# Patient Record
Sex: FEMALE | Race: WHITE | NOT HISPANIC OR LATINO | URBAN - METROPOLITAN AREA
[De-identification: names, ages, dates, MRNs, and addresses within clinical notes are randomized per-mention and may not be internally consistent; named-entity substitution may affect disease eponyms.]

---

## 2017-09-22 PROBLEM — Z00.00 ENCOUNTER FOR PREVENTIVE HEALTH EXAMINATION: Status: ACTIVE | Noted: 2017-09-22

## 2017-09-28 ENCOUNTER — OUTPATIENT (OUTPATIENT)
Dept: OUTPATIENT SERVICES | Facility: HOSPITAL | Age: 77
LOS: 1 days | End: 2017-09-28
Payer: MEDICARE

## 2017-09-28 ENCOUNTER — APPOINTMENT (OUTPATIENT)
Dept: CT IMAGING | Facility: CLINIC | Age: 77
End: 2017-09-28
Payer: MEDICARE

## 2017-09-28 DIAGNOSIS — Z00.8 ENCOUNTER FOR OTHER GENERAL EXAMINATION: ICD-10-CM

## 2017-09-28 PROCEDURE — 70450 CT HEAD/BRAIN W/O DYE: CPT

## 2017-09-28 PROCEDURE — 70450 CT HEAD/BRAIN W/O DYE: CPT | Mod: 26

## 2017-12-28 ENCOUNTER — OUTPATIENT (OUTPATIENT)
Dept: OUTPATIENT SERVICES | Facility: HOSPITAL | Age: 77
LOS: 1 days | End: 2017-12-28
Payer: MEDICARE

## 2017-12-28 ENCOUNTER — APPOINTMENT (OUTPATIENT)
Dept: CT IMAGING | Facility: CLINIC | Age: 77
End: 2017-12-28
Payer: MEDICARE

## 2017-12-28 DIAGNOSIS — Z00.8 ENCOUNTER FOR OTHER GENERAL EXAMINATION: ICD-10-CM

## 2017-12-28 PROCEDURE — 74177 CT ABD & PELVIS W/CONTRAST: CPT

## 2017-12-28 PROCEDURE — 82565 ASSAY OF CREATININE: CPT

## 2017-12-28 PROCEDURE — 74177 CT ABD & PELVIS W/CONTRAST: CPT | Mod: 26

## 2018-08-06 ENCOUNTER — EMERGENCY (EMERGENCY)
Facility: HOSPITAL | Age: 78
LOS: 1 days | Discharge: HOME | End: 2018-08-06
Attending: EMERGENCY MEDICINE | Admitting: EMERGENCY MEDICINE
Payer: COMMERCIAL

## 2018-08-06 VITALS
WEIGHT: 123.46 LBS | OXYGEN SATURATION: 99 % | TEMPERATURE: 98 F | DIASTOLIC BLOOD PRESSURE: 69 MMHG | RESPIRATION RATE: 16 BRPM | HEART RATE: 111 BPM | SYSTOLIC BLOOD PRESSURE: 111 MMHG

## 2018-08-06 VITALS
TEMPERATURE: 98 F | HEART RATE: 96 BPM | SYSTOLIC BLOOD PRESSURE: 93 MMHG | OXYGEN SATURATION: 96 % | RESPIRATION RATE: 18 BRPM | DIASTOLIC BLOOD PRESSURE: 52 MMHG

## 2018-08-06 DIAGNOSIS — R07.81 PLEURODYNIA: ICD-10-CM

## 2018-08-06 PROCEDURE — 71101 X-RAY EXAM UNILAT RIBS/CHEST: CPT

## 2018-08-06 PROCEDURE — 71101 X-RAY EXAM UNILAT RIBS/CHEST: CPT | Mod: 26

## 2018-08-06 PROCEDURE — 99283 EMERGENCY DEPT VISIT LOW MDM: CPT

## 2018-08-06 RX ORDER — ACETAMINOPHEN 500 MG
650 TABLET ORAL ONCE
Qty: 0 | Refills: 0 | Status: COMPLETED | OUTPATIENT
Start: 2018-08-06 | End: 2018-08-06

## 2018-08-06 RX ADMIN — Medication 650 MILLIGRAM(S): at 15:28

## 2018-08-06 NOTE — ED PROVIDER NOTE - CALF TENDERNESS
tender left lateral chest wall with palpation along rib cage ; no crepitus; no bruising; no step off.

## 2018-08-06 NOTE — ED ADULT NURSE NOTE - INTERVENTIONS DEFINITIONS
Room bathroom lighting operational/Stretcher in lowest position, wheels locked, appropriate side rails in place/Monitor for mental status changes and reorient to person, place, and time/Review medications for side effects contributing to fall risk/Reinforce activity limits and safety measures with patient and family/[place] to call system/Non-slip footwear when patient is off stretcher/Physically safe environment: no spills, clutter or unnecessary equipment/Call bell, personal items and telephone within reach/Instruct patient to call for assistance/Monitor gait and stability

## 2018-11-02 ENCOUNTER — INPATIENT (INPATIENT)
Facility: HOSPITAL | Age: 78
LOS: 4 days | Discharge: HOME | DRG: 843 | End: 2018-11-07
Attending: FAMILY MEDICINE | Admitting: FAMILY MEDICINE
Payer: MEDICARE

## 2018-11-02 VITALS
HEART RATE: 92 BPM | OXYGEN SATURATION: 99 % | HEIGHT: 64 IN | SYSTOLIC BLOOD PRESSURE: 100 MMHG | DIASTOLIC BLOOD PRESSURE: 55 MMHG | WEIGHT: 110.01 LBS | TEMPERATURE: 98 F | RESPIRATION RATE: 16 BRPM

## 2018-11-02 DIAGNOSIS — I49.9 CARDIAC ARRHYTHMIA, UNSPECIFIED: ICD-10-CM

## 2018-11-02 DIAGNOSIS — R18.8 OTHER ASCITES: ICD-10-CM

## 2018-11-02 DIAGNOSIS — D50.8 OTHER IRON DEFICIENCY ANEMIAS: ICD-10-CM

## 2018-11-02 DIAGNOSIS — Z98.890 OTHER SPECIFIED POSTPROCEDURAL STATES: Chronic | ICD-10-CM

## 2018-11-02 DIAGNOSIS — Z29.9 ENCOUNTER FOR PROPHYLACTIC MEASURES, UNSPECIFIED: ICD-10-CM

## 2018-11-02 DIAGNOSIS — Z90.710 ACQUIRED ABSENCE OF BOTH CERVIX AND UTERUS: Chronic | ICD-10-CM

## 2018-11-02 PROBLEM — S06.9X9S UNSPECIFIED INTRACRANIAL INJURY WITH LOSS OF CONSCIOUSNESS OF UNSPECIFIED DURATION, SEQUELA: Chronic | Status: ACTIVE | Noted: 2018-08-06

## 2018-11-02 LAB
ALBUMIN SERPL ELPH-MCNC: 1.9 G/DL — LOW (ref 3.3–5)
ALP SERPL-CCNC: 97 U/L — SIGNIFICANT CHANGE UP (ref 30–120)
ALT FLD-CCNC: 14 U/L DA — SIGNIFICANT CHANGE UP (ref 10–60)
ANION GAP SERPL CALC-SCNC: 5 MMOL/L — SIGNIFICANT CHANGE UP (ref 5–17)
APTT BLD: 27.5 SEC — SIGNIFICANT CHANGE UP (ref 27.5–36.3)
AST SERPL-CCNC: 22 U/L — SIGNIFICANT CHANGE UP (ref 10–40)
BASOPHILS # BLD AUTO: 0.02 K/UL — SIGNIFICANT CHANGE UP (ref 0–0.2)
BASOPHILS NFR BLD AUTO: 0.2 % — SIGNIFICANT CHANGE UP (ref 0–2)
BILIRUB SERPL-MCNC: 0.4 MG/DL — SIGNIFICANT CHANGE UP (ref 0.2–1.2)
BUN SERPL-MCNC: 11 MG/DL — SIGNIFICANT CHANGE UP (ref 7–23)
CALCIUM SERPL-MCNC: 8.7 MG/DL — SIGNIFICANT CHANGE UP (ref 8.4–10.5)
CHLORIDE SERPL-SCNC: 102 MMOL/L — SIGNIFICANT CHANGE UP (ref 96–108)
CO2 SERPL-SCNC: 33 MMOL/L — HIGH (ref 22–31)
CREAT SERPL-MCNC: 0.6 MG/DL — SIGNIFICANT CHANGE UP (ref 0.5–1.3)
EOSINOPHIL # BLD AUTO: 0.01 K/UL — SIGNIFICANT CHANGE UP (ref 0–0.5)
EOSINOPHIL NFR BLD AUTO: 0.1 % — SIGNIFICANT CHANGE UP (ref 0–6)
GLUCOSE SERPL-MCNC: 101 MG/DL — HIGH (ref 70–99)
HCT VFR BLD CALC: 28.9 % — LOW (ref 34.5–45)
HGB BLD-MCNC: 8.8 G/DL — LOW (ref 11.5–15.5)
IMM GRANULOCYTES NFR BLD AUTO: 0.5 % — SIGNIFICANT CHANGE UP (ref 0–1.5)
INR BLD: 1.12 RATIO — SIGNIFICANT CHANGE UP (ref 0.88–1.16)
LYMPHOCYTES # BLD AUTO: 1.32 K/UL — SIGNIFICANT CHANGE UP (ref 1–3.3)
LYMPHOCYTES # BLD AUTO: 16.3 % — SIGNIFICANT CHANGE UP (ref 13–44)
MCHC RBC-ENTMCNC: 25.8 PG — LOW (ref 27–34)
MCHC RBC-ENTMCNC: 30.4 GM/DL — LOW (ref 32–36)
MCV RBC AUTO: 84.8 FL — SIGNIFICANT CHANGE UP (ref 80–100)
MONOCYTES # BLD AUTO: 0.95 K/UL — HIGH (ref 0–0.9)
MONOCYTES NFR BLD AUTO: 11.7 % — SIGNIFICANT CHANGE UP (ref 2–14)
NEUTROPHILS # BLD AUTO: 5.76 K/UL — SIGNIFICANT CHANGE UP (ref 1.8–7.4)
NEUTROPHILS NFR BLD AUTO: 71.2 % — SIGNIFICANT CHANGE UP (ref 43–77)
NRBC # BLD: 0 /100 WBCS — SIGNIFICANT CHANGE UP (ref 0–0)
PH FLD: 7.47 — SIGNIFICANT CHANGE UP
PLATELET # BLD AUTO: 395 K/UL — SIGNIFICANT CHANGE UP (ref 150–400)
POTASSIUM SERPL-MCNC: 4.4 MMOL/L — SIGNIFICANT CHANGE UP (ref 3.5–5.3)
POTASSIUM SERPL-SCNC: 4.4 MMOL/L — SIGNIFICANT CHANGE UP (ref 3.5–5.3)
PROT SERPL-MCNC: 5.5 G/DL — LOW (ref 6–8.3)
PROTHROM AB SERPL-ACNC: 12.3 SEC — SIGNIFICANT CHANGE UP (ref 10–12.9)
RBC # BLD: 3.41 M/UL — LOW (ref 3.8–5.2)
RBC # FLD: 21.3 % — HIGH (ref 10.3–14.5)
SODIUM SERPL-SCNC: 140 MMOL/L — SIGNIFICANT CHANGE UP (ref 135–145)
WBC # BLD: 8.1 K/UL — SIGNIFICANT CHANGE UP (ref 3.8–10.5)
WBC # FLD AUTO: 8.1 K/UL — SIGNIFICANT CHANGE UP (ref 3.8–10.5)

## 2018-11-02 PROCEDURE — 99223 1ST HOSP IP/OBS HIGH 75: CPT | Mod: AI

## 2018-11-02 PROCEDURE — 93010 ELECTROCARDIOGRAM REPORT: CPT

## 2018-11-02 PROCEDURE — 74176 CT ABD & PELVIS W/O CONTRAST: CPT | Mod: 26

## 2018-11-02 PROCEDURE — 99285 EMERGENCY DEPT VISIT HI MDM: CPT

## 2018-11-02 PROCEDURE — 74019 RADEX ABDOMEN 2 VIEWS: CPT | Mod: 26

## 2018-11-02 PROCEDURE — 71046 X-RAY EXAM CHEST 2 VIEWS: CPT | Mod: 26

## 2018-11-02 RX ORDER — DIGOXIN 250 MCG
1 TABLET ORAL
Qty: 0 | Refills: 0 | COMMUNITY

## 2018-11-02 RX ORDER — METOPROLOL TARTRATE 50 MG
0 TABLET ORAL
Qty: 0 | Refills: 0 | COMMUNITY

## 2018-11-02 RX ORDER — MORPHINE SULFATE 50 MG/1
2 CAPSULE, EXTENDED RELEASE ORAL ONCE
Qty: 0 | Refills: 0 | Status: DISCONTINUED | OUTPATIENT
Start: 2018-11-02 | End: 2018-11-02

## 2018-11-02 RX ORDER — SODIUM CHLORIDE 9 MG/ML
1000 INJECTION INTRAMUSCULAR; INTRAVENOUS; SUBCUTANEOUS ONCE
Qty: 0 | Refills: 0 | Status: COMPLETED | OUTPATIENT
Start: 2018-11-02 | End: 2018-11-02

## 2018-11-02 RX ORDER — MIRTAZAPINE 45 MG/1
15 TABLET, ORALLY DISINTEGRATING ORAL AT BEDTIME
Qty: 0 | Refills: 0 | Status: DISCONTINUED | OUTPATIENT
Start: 2018-11-02 | End: 2018-11-07

## 2018-11-02 RX ORDER — METOPROLOL TARTRATE 50 MG
25 TABLET ORAL DAILY
Qty: 0 | Refills: 0 | Status: DISCONTINUED | OUTPATIENT
Start: 2018-11-02 | End: 2018-11-07

## 2018-11-02 RX ORDER — MORPHINE SULFATE 50 MG/1
2 CAPSULE, EXTENDED RELEASE ORAL EVERY 4 HOURS
Qty: 0 | Refills: 0 | Status: DISCONTINUED | OUTPATIENT
Start: 2018-11-02 | End: 2018-11-07

## 2018-11-02 RX ORDER — ONDANSETRON 8 MG/1
4 TABLET, FILM COATED ORAL ONCE
Qty: 0 | Refills: 0 | Status: COMPLETED | OUTPATIENT
Start: 2018-11-02 | End: 2018-11-02

## 2018-11-02 RX ORDER — DIGOXIN 250 MCG
0.12 TABLET ORAL DAILY
Qty: 0 | Refills: 0 | Status: DISCONTINUED | OUTPATIENT
Start: 2018-11-02 | End: 2018-11-04

## 2018-11-02 RX ORDER — ALBUMIN HUMAN 25 %
50 VIAL (ML) INTRAVENOUS ONCE
Qty: 0 | Refills: 0 | Status: COMPLETED | OUTPATIENT
Start: 2018-11-02 | End: 2018-11-02

## 2018-11-02 RX ORDER — SODIUM CHLORIDE 9 MG/ML
3 INJECTION INTRAMUSCULAR; INTRAVENOUS; SUBCUTANEOUS ONCE
Qty: 0 | Refills: 0 | Status: COMPLETED | OUTPATIENT
Start: 2018-11-02 | End: 2018-11-02

## 2018-11-02 RX ORDER — IOHEXOL 300 MG/ML
30 INJECTION, SOLUTION INTRAVENOUS ONCE
Qty: 0 | Refills: 0 | Status: COMPLETED | OUTPATIENT
Start: 2018-11-02 | End: 2018-11-02

## 2018-11-02 RX ORDER — INFLUENZA VIRUS VACCINE 15; 15; 15; 15 UG/.5ML; UG/.5ML; UG/.5ML; UG/.5ML
0.5 SUSPENSION INTRAMUSCULAR ONCE
Qty: 0 | Refills: 0 | Status: COMPLETED | OUTPATIENT
Start: 2018-11-02 | End: 2018-11-07

## 2018-11-02 RX ADMIN — MORPHINE SULFATE 2 MILLIGRAM(S): 50 CAPSULE, EXTENDED RELEASE ORAL at 14:04

## 2018-11-02 RX ADMIN — Medication 50 MILLILITER(S): at 21:51

## 2018-11-02 RX ADMIN — SODIUM CHLORIDE 1000 MILLILITER(S): 9 INJECTION INTRAMUSCULAR; INTRAVENOUS; SUBCUTANEOUS at 14:09

## 2018-11-02 RX ADMIN — ONDANSETRON 4 MILLIGRAM(S): 8 TABLET, FILM COATED ORAL at 15:55

## 2018-11-02 RX ADMIN — MORPHINE SULFATE 2 MILLIGRAM(S): 50 CAPSULE, EXTENDED RELEASE ORAL at 21:33

## 2018-11-02 RX ADMIN — SODIUM CHLORIDE 1000 MILLILITER(S): 9 INJECTION INTRAMUSCULAR; INTRAVENOUS; SUBCUTANEOUS at 15:15

## 2018-11-02 RX ADMIN — Medication 50 MILLILITER(S): at 20:58

## 2018-11-02 RX ADMIN — MORPHINE SULFATE 2 MILLIGRAM(S): 50 CAPSULE, EXTENDED RELEASE ORAL at 15:28

## 2018-11-02 RX ADMIN — ONDANSETRON 4 MILLIGRAM(S): 8 TABLET, FILM COATED ORAL at 14:04

## 2018-11-02 RX ADMIN — IOHEXOL 30 MILLILITER(S): 300 INJECTION, SOLUTION INTRAVENOUS at 14:30

## 2018-11-02 RX ADMIN — MIRTAZAPINE 15 MILLIGRAM(S): 45 TABLET, ORALLY DISINTEGRATING ORAL at 23:01

## 2018-11-02 RX ADMIN — MORPHINE SULFATE 2 MILLIGRAM(S): 50 CAPSULE, EXTENDED RELEASE ORAL at 20:42

## 2018-11-02 RX ADMIN — SODIUM CHLORIDE 3 MILLILITER(S): 9 INJECTION INTRAMUSCULAR; INTRAVENOUS; SUBCUTANEOUS at 14:09

## 2018-11-02 NOTE — ED PROVIDER NOTE - OBJECTIVE STATEMENT
Pt is a 79 y/o F, with hx of TBI, presenting to the ED with c/o abdominal distention and discomfort for the past week. Pt states she has had constipation over the same time frame but had one small BM yesterday. Associated N/V. Denies fever, chills, CP, SOB, and any other sxs. No hx of same.  Brissa- PMRALEIGH

## 2018-11-02 NOTE — H&P ADULT - HISTORY OF PRESENT ILLNESS
78F with unknown primary CA s/p hysterectomy in 2015 (with some lymph nodes still present), hx of syncope with a TBI in 2017 with resulting cognitive impairment who presents with abdominal distension and pain.  Patient has been suffering off and on with abdominal discomfort for awhile but for the past week her abdomen has grown in size.  With the increase in size her discomfort has now progressed to pain.  Patient also now has started to experience nausea and bilious nonbloody vomiting.  Patient went to her NP yesterday and they were concerned for a blockage and had an xray.  Since the symptoms did not irma, patient was told to come here for further evaluation and treatment.  In the ED, patient was found to massive ascites on CT.  Also found to have anemia with hgb of 8.8. 78F with unknown primary CA s/p hysterectomy in 2015 (with some lymph nodes still present), hx of syncope with a TBI in 2017 with resulting mild cognitive impairment who presents with abdominal distension and pain.  Patient has been suffering off and on with abdominal discomfort for awhile but for the past week her abdomen has grown in size.  With the increase in size her discomfort has now progressed to pain.  Patient also now has started to experience nausea and bilious nonbloody vomiting.  Patient went to her NP yesterday and they were concerned for a blockage and had an xray.  Since the symptoms did not irma, patient was told to come here for further evaluation and treatment.  In the ED, patient was found to massive ascites on CT.  Also found to have anemia with hgb of 8.8.

## 2018-11-02 NOTE — ED ADULT NURSE REASSESSMENT NOTE - NS ED NURSE REASSESS COMMENT FT1
pt resting comfortably on stretcher skin warm and dry no distress pt wants food pt pending admission
pt tolerated oral contrast and pt pending ct scan  no vomiting no diarrhea since arrival
pt c/o nausea and medicated for nausea vomited about 100cc bilious material nausea better after zofran

## 2018-11-02 NOTE — ED PROVIDER NOTE - PROGRESS NOTE DETAILS
Dr Qasim greenwood Fabiano Tripp, will hold admission until he fabiano HODGE. Dr Qasim greenwood. Pt doing well, no acute co or changes. Pain controlled at this time. Dw Dr Tripp, he dw Dr VEL Fernandes, now accepts pt.

## 2018-11-02 NOTE — H&P ADULT - NSHPPHYSICALEXAM_GEN_ALL_CORE
PHYSICAL EXAM:  Vital Signs Last 24 Hrs  T(C): 36.7 (02 Nov 2018 19:18), Max: 36.7 (02 Nov 2018 19:18)  T(F): 98.1 (02 Nov 2018 19:18), Max: 98.1 (02 Nov 2018 19:18)  HR: 96 (02 Nov 2018 20:46) (92 - 101)  BP: 107/60 (02 Nov 2018 20:46) (100/42 - 108/58)  BP(mean): --  RR: 20 (02 Nov 2018 20:46) (16 - 20)  SpO2: 100% (02 Nov 2018 20:46) (98% - 100%)    GENERAL:     thin female in NAD  HEAD:     atraumatic, normocephalic  EYES:     EOMI, conjunctiva and sclera clear  ENMT:     no tonsillar erythema or exudates or enlargement, no oral lesions, moist mucous membranes, good dentition  NECK:     supple, no JVD  RESPIRATORY:     clear to auscultation bilaterally, no rales or rhonchi or wheezing or rubs  CARDIOVASCULAR:     regular rate and rhythm, no murmurs or rubs or gallops, 2+ peripheral pulses  GASTROINTESTINAL:     soft, nontender, nondistended, no hepatosplenomegaly palpated, bowel sounds present  EXTREMITIES:     no clubbing or cyanosis or edema  MUSCULOSKELETAL:     no joint pain or swelling or deformities  NERVOUS SYSTEM:     motor strength intact with 5/5 B/L upper and lower extremities, no gross sensory deficits  SKIN:     no rashes or lesions  PSYCH:     appropriate, alert and orientated x3, good concentration PHYSICAL EXAM:  Vital Signs Last 24 Hrs  T(C): 36.7 (02 Nov 2018 19:18), Max: 36.7 (02 Nov 2018 19:18)  T(F): 98.1 (02 Nov 2018 19:18), Max: 98.1 (02 Nov 2018 19:18)  HR: 96 (02 Nov 2018 20:46) (92 - 101)  BP: 107/60 (02 Nov 2018 20:46) (100/42 - 108/58)  BP(mean): --  RR: 20 (02 Nov 2018 20:46) (16 - 20)  SpO2: 100% (02 Nov 2018 20:46) (98% - 100%)    GENERAL:     thin female in NAD  HEAD:     atraumatic, normocephalic  EYES:     EOMI, conjunctiva and sclera clear  ENMT:     no tonsillar erythema or exudates or enlargement, no oral lesions, moist mucous membranes, good dentition  NECK:     supple, no JVD  RESPIRATORY:     clear to auscultation bilaterally, no rales or rhonchi or wheezing or rubs  CARDIOVASCULAR:     +tachycardia, no murmurs or rubs or gallops, 2+ peripheral pulses  GASTROINTESTINAL:     +large, +distended, +tender to palpation, +fluid wave, +  EXTREMITIES:     no clubbing or cyanosis or edema  MUSCULOSKELETAL:     no joint pain or swelling or deformities  NERVOUS SYSTEM:     motor strength intact with 5/5 B/L upper and lower extremities, no gross sensory deficits  SKIN:     no rashes or lesions  PSYCH:     appropriate, alert and orientated x3, good concentration

## 2018-11-02 NOTE — H&P ADULT - ASSESSMENT
78F with unknown primary CA s/p hysterectomy in 2015 (with some lymph nodes still present), hx of syncope with a TBI in 2017 with resulting mild cognitive impairment who presents with abdominal distension and pain.

## 2018-11-02 NOTE — H&P ADULT - PROBLEM SELECTOR PLAN 3
IMPROVE VTE Individual Risk Assessment          RISK                                                          Points  [  ] Previous VTE                                                 3  [  ] Thrombophilia                                              2  [  ] Lower limb paralysis                                    2        (unable to hold up >15 seconds)    [  ] Current Cancer                                             2         (within 6 months)  [  ] Immobilization > 24 hrs                              1  [  ] ICU/CCU stay > 24 hours                            1  [X] Age > 60                                                        1    IMPROVE VTE Score  1     - hold AC as patient will be having paracentesis.  SCD for now

## 2018-11-02 NOTE — PROCEDURE NOTE - PROCEDURE
<<-----Click on this checkbox to enter Procedure Paracentesis at bedside  11/02/2018  s/p tasp sterile tecnique 5 cc of lidocaine was injected withdrew about 6 liters of fluid with 25% albumin given  Active  KSIDERID

## 2018-11-02 NOTE — H&P ADULT - FAMILY HISTORY
Mother  Still living? Unknown  Family history of cancer in mother, Age at diagnosis: Age Unknown  Family history of heart disease, Age at diagnosis: Age Unknown

## 2018-11-02 NOTE — H&P ADULT - PMH
Anemia    Atrial fibrillation  daughter reports no hx of afib, just palpitations.  Stomach cancer    Traumatic brain injury with loss of consciousness, sequela

## 2018-11-02 NOTE — CONSULT NOTE ADULT - SUBJECTIVE AND OBJECTIVE BOX
Chief Complaint:  Patient is a 78y old  Female who presents with a chief complaint of   · Chief Complaint: The patient is a 78y Female complaining of abdominal pain.	  · HPI Objective Statement: Pt is a 79 y/o F, with hx of TBI, presenting to the ED with c/o abdominal distention and discomfort for the past week. Pt states she has had constipation over the same time frame but had one small BM yesterday. Associated N/V. Denies fever, chills, CP, SOB, and any other sxs. No hx of same. Mazid- PMD	  · Presenting Symptoms: ABDOMINAL DISTENSION, abd discomfort	  · Negative Findings: no chills, no fever	  · Location: abdomen	  · Timing: constant	  · Duration: week(s)  1	  · Context: dec BM	  · Recent Exposure To: none known	  ascites on exam    Allergies:  No Known Allergies      Medications:  albumin human 25% IVPB 50 milliLiter(s) IV Intermittent once  digoxin     Tablet 0.125 milliGRAM(s) Oral daily  influenza   Vaccine 0.5 milliLiter(s) IntraMuscular once  metoprolol succinate ER 25 milliGRAM(s) Oral daily  mirtazapine 15 milliGRAM(s) Oral at bedtime  morphine  - Injectable 2 milliGRAM(s) IV Push every 4 hours PRN      PMHX/PSHX:  Anemia  Atrial fibrillation  Stomach cancer  Traumatic brain injury with loss of consciousness, sequela  History of loop recorder  H/O: hysterectomy  No significant past surgical history      Family history:  No pertinent family history in first degree relatives      Social History: lives with family no etoh nop cigs    ROS:     General:  No wt loss, fevers, chills, night sweats, fatigue,   Eyes:  Good vision, no reported pain  ENT:  No sore throat, pain, runny nose, dysphagia  CV:  No pain, palpitations, hypo/hypertension  Resp:  No dyspnea, cough, tachypnea, wheezing  GI:  No pain, No nausea, No vomiting, No diarrhea, No constipation, No weight loss, No fever, No pruritis, No rectal bleeding, No tarry stools, No dysphagia,  :  No pain, bleeding, incontinence, nocturia  Muscle:  No pain, weakness  Neuro:  No weakness, tingling, memory problems  Psych:  No fatigue, insomnia, mood problems, depression  Endocrine:  No polyuria, polydipsia, cold/heat intolerance  Heme:  No petechiae, ecchymosis, easy bruisability  Skin:  No rash, tattoos, scars, edema      PHYSICAL EXAM:   Vital Signs:  Vital Signs Last 24 Hrs  T(C): 36.7 (02 Nov 2018 19:18), Max: 36.7 (02 Nov 2018 19:18)  T(F): 98.1 (02 Nov 2018 19:18), Max: 98.1 (02 Nov 2018 19:18)  HR: 101 (02 Nov 2018 19:18) (92 - 101)  BP: 105/44 (02 Nov 2018 19:18) (100/42 - 108/58)  BP(mean): --  RR: 20 (02 Nov 2018 18:18) (16 - 20)  SpO2: 100% (02 Nov 2018 18:18) (98% - 100%)  Daily Height in cm: 162.56 (02 Nov 2018 13:39)    Daily     GENERAL:  Appears stated age, well-groomed, well-nourished, no distress  HEENT:  NC/AT,  conjunctivae clear and pink, no thyromegaly, nodules, adenopathy, no JVD, sclera -anicteric  CHEST:  Full & symmetric excursion, no increased effort, breath sounds clear  HEART:  Regular rhythm, S1, S2, no murmur/rub/S3/S4, no abdominal bruit, no edema  ABDOMEN:  Soft, non-tender, non-distended, normoactive bowel sounds,  no masses ,no hepato-splenomegaly, no signs of chronic liver disease  EXTEREMITIES:  no cyanosis,clubbing or edema  SKIN:  No rash/erythema/ecchymoses/petechiae/wounds/abscess/warm/dry  NEURO:  Alert, oriented, no asterixis, no tremor, no encephalopathy    LABS:                        8.8    8.10  )-----------( 395      ( 02 Nov 2018 14:01 )             28.9     11-02    140  |  102  |  11  ----------------------------<  101<H>  4.4   |  33<H>  |  0.60    Ca    8.7      02 Nov 2018 14:01    TPro  5.5<L>  /  Alb  1.9<L>  /  TBili  0.4  /  DBili  x   /  AST  22  /  ALT  14  /  AlkPhos  97  11-02    LIVER FUNCTIONS - ( 02 Nov 2018 14:01 )  Alb: 1.9 g/dL / Pro: 5.5 g/dL / ALK PHOS: 97 U/L / ALT: 14 U/L DA / AST: 22 U/L / GGT: x           PT/INR - ( 02 Nov 2018 14:01 )   PT: 12.3 sec;   INR: 1.12 ratio         PTT - ( 02 Nov 2018 14:01 )  PTT:27.5 sec        Imaging:

## 2018-11-02 NOTE — H&P ADULT - NSHPREVIEWOFSYSTEMS_GEN_ALL_CORE
REVIEW OF SYSTEMS:  CONSTITUTIONAL:    +weight gain in her abdomen but weight loss overall  EYES:    no eye pain or visual disturbances or discharge  ENMT:     no difficulty hearing or tinnitus or vertigo, no sinus or throat pain  NECK:    no pain or stiffness  RESPIRATORY:    no cough or wheezing or chills or hemoptysis, no shortness of breath  CARDIOVASCULAR:    no chest pain or palpitations or dizziness or leg swelling  GASTROINTESTINAL:    +abdominal pain, +nausea/vomiting   GENITOURINARY:    no dysuria or frequency or hematuria or incontinence  NEUROLOGICAL:    no headaches or memory loss or loss of strength or numbness or tremors  SKIN:    no itching or burning or rashes or lesions   LYMPH NODES:    no enlarged glands  ENDOCRINE:    no heat or cold intolerance, no hair loss, no polydipsia or polyuria  MUSCULOSKELETAL:    no joint pain or swelling, no muscle or back or extremity pain  PSYCHIATRIC:    no depression or anxiety or mood swings or difficulty sleeping  HEME/LYMPH:    no easy bruising or bleeding gums  ALLERGY AND IMMUNOLOGIC:    no hives or eczema

## 2018-11-02 NOTE — ED ADULT NURSE NOTE - OBJECTIVE STATEMENT
pt c/o abd distention went to [facility] yesterday and given miralax and mag citrate and has 1 bm loose dark conjunctiva pale lungs diminished right base otherwise clear  pt c/o nausea and vomited x 1 green fluid

## 2018-11-02 NOTE — ED ADULT NURSE NOTE - NSIMPLEMENTINTERV_GEN_ALL_ED
Implemented All Fall with Harm Risk Interventions:  [place] to call system. Call bell, personal items and telephone within reach. Instruct patient to call for assistance. Room bathroom lighting operational. Non-slip footwear when patient is off stretcher. Physically safe environment: no spills, clutter or unnecessary equipment. Stretcher in lowest position, wheels locked, appropriate side rails in place. Provide visual cue, wrist band, yellow gown, etc. Monitor gait and stability. Monitor for mental status changes and reorient to person, place, and time. Review medications for side effects contributing to fall risk. Reinforce activity limits and safety measures with patient and family. Provide visual clues: red socks.

## 2018-11-02 NOTE — H&P ADULT - NSHPSOCIALHISTORY_GEN_ALL_CORE
+ former smoker (quit 30 yrs ago, was a 1ppd for about 30 yrs)  - denies any etoh or illegal drug use  - lives with family

## 2018-11-02 NOTE — ED ADULT NURSE NOTE - PMH
Anemia    Atrial fibrillation    Stomach cancer    Traumatic brain injury with loss of consciousness, sequela

## 2018-11-02 NOTE — ED PROVIDER NOTE - CARE PLAN
Principal Discharge DX:	Other ascites  Secondary Diagnosis:	Abdominal pain, unspecified abdominal location

## 2018-11-02 NOTE — ED PROVIDER NOTE - MEDICAL DECISION MAKING DETAILS
labs, Xray, zofran, morphine, IV fluids. pt with 1 week abd pain, distention, nausea, vomiting, decreased stools - labs, Xray, zofran, morphine, IV fluids.

## 2018-11-03 DIAGNOSIS — D64.9 ANEMIA, UNSPECIFIED: ICD-10-CM

## 2018-11-03 DIAGNOSIS — R18.0 MALIGNANT ASCITES: ICD-10-CM

## 2018-11-03 DIAGNOSIS — D50.9 IRON DEFICIENCY ANEMIA, UNSPECIFIED: ICD-10-CM

## 2018-11-03 LAB
ALBUMIN SERPL ELPH-MCNC: 1.9 G/DL — LOW (ref 3.3–5)
ALP SERPL-CCNC: 71 U/L — SIGNIFICANT CHANGE UP (ref 30–120)
ALT FLD-CCNC: 12 U/L DA — SIGNIFICANT CHANGE UP (ref 10–60)
AMYLASE FLD-CCNC: 24 U/L — SIGNIFICANT CHANGE UP
ANION GAP SERPL CALC-SCNC: 6 MMOL/L — SIGNIFICANT CHANGE UP (ref 5–17)
APTT BLD: 31.5 SEC — SIGNIFICANT CHANGE UP (ref 27.5–36.3)
AST SERPL-CCNC: 15 U/L — SIGNIFICANT CHANGE UP (ref 10–40)
B PERT IGG+IGM PNL SER: ABNORMAL
BASOPHILS # BLD AUTO: 0.02 K/UL — SIGNIFICANT CHANGE UP (ref 0–0.2)
BASOPHILS NFR BLD AUTO: 0.3 % — SIGNIFICANT CHANGE UP (ref 0–2)
BILIRUB SERPL-MCNC: 0.3 MG/DL — SIGNIFICANT CHANGE UP (ref 0.2–1.2)
BLD GP AB SCN SERPL QL: SIGNIFICANT CHANGE UP
BUN SERPL-MCNC: 11 MG/DL — SIGNIFICANT CHANGE UP (ref 7–23)
CALCIUM SERPL-MCNC: 8.2 MG/DL — LOW (ref 8.4–10.5)
CHLORIDE SERPL-SCNC: 105 MMOL/L — SIGNIFICANT CHANGE UP (ref 96–108)
CO2 SERPL-SCNC: 30 MMOL/L — SIGNIFICANT CHANGE UP (ref 22–31)
COLOR FLD: SIGNIFICANT CHANGE UP
CREAT SERPL-MCNC: 0.62 MG/DL — SIGNIFICANT CHANGE UP (ref 0.5–1.3)
EOSINOPHIL # BLD AUTO: 0.04 K/UL — SIGNIFICANT CHANGE UP (ref 0–0.5)
EOSINOPHIL NFR BLD AUTO: 0.6 % — SIGNIFICANT CHANGE UP (ref 0–6)
FLUID INTAKE SUBSTANCE CLASS: SIGNIFICANT CHANGE UP
FLUID SEGMENTED GRANULOCYTES: 3 % — SIGNIFICANT CHANGE UP
GLUCOSE FLD-MCNC: 100 MG/DL — SIGNIFICANT CHANGE UP
GLUCOSE SERPL-MCNC: 74 MG/DL — SIGNIFICANT CHANGE UP (ref 70–99)
HCT VFR BLD CALC: 23.7 % — LOW (ref 34.5–45)
HCT VFR BLD CALC: 24.2 % — LOW (ref 34.5–45)
HGB BLD-MCNC: 7 G/DL — CRITICAL LOW (ref 11.5–15.5)
HGB BLD-MCNC: 7.2 G/DL — LOW (ref 11.5–15.5)
IMM GRANULOCYTES NFR BLD AUTO: 0.3 % — SIGNIFICANT CHANGE UP (ref 0–1.5)
INR BLD: 1.17 RATIO — HIGH (ref 0.88–1.16)
LDH SERPL L TO P-CCNC: 105 U/L — SIGNIFICANT CHANGE UP
LYMPHOCYTES # BLD AUTO: 0.91 K/UL — LOW (ref 1–3.3)
LYMPHOCYTES # BLD AUTO: 14.6 % — SIGNIFICANT CHANGE UP (ref 13–44)
LYMPHOCYTES # FLD: 44 % — SIGNIFICANT CHANGE UP
MAGNESIUM SERPL-MCNC: 2.1 MG/DL — SIGNIFICANT CHANGE UP (ref 1.6–2.6)
MCHC RBC-ENTMCNC: 25.4 PG — LOW (ref 27–34)
MCHC RBC-ENTMCNC: 25.4 PG — LOW (ref 27–34)
MCHC RBC-ENTMCNC: 29.5 GM/DL — LOW (ref 32–36)
MCHC RBC-ENTMCNC: 29.8 GM/DL — LOW (ref 32–36)
MCV RBC AUTO: 85.5 FL — SIGNIFICANT CHANGE UP (ref 80–100)
MCV RBC AUTO: 85.9 FL — SIGNIFICANT CHANGE UP (ref 80–100)
MONOCYTES # BLD AUTO: 0.87 K/UL — SIGNIFICANT CHANGE UP (ref 0–0.9)
MONOCYTES NFR BLD AUTO: 13.9 % — SIGNIFICANT CHANGE UP (ref 2–14)
MONOS+MACROS # FLD: 53 % — SIGNIFICANT CHANGE UP
NEUTROPHILS # BLD AUTO: 4.39 K/UL — SIGNIFICANT CHANGE UP (ref 1.8–7.4)
NEUTROPHILS NFR BLD AUTO: 70.3 % — SIGNIFICANT CHANGE UP (ref 43–77)
NRBC # BLD: 0 /100 WBCS — SIGNIFICANT CHANGE UP (ref 0–0)
PHOSPHATE SERPL-MCNC: 3.5 MG/DL — SIGNIFICANT CHANGE UP (ref 2.5–4.5)
PLATELET # BLD AUTO: 316 K/UL — SIGNIFICANT CHANGE UP (ref 150–400)
PLATELET # BLD AUTO: 344 K/UL — SIGNIFICANT CHANGE UP (ref 150–400)
POTASSIUM SERPL-MCNC: 4.2 MMOL/L — SIGNIFICANT CHANGE UP (ref 3.5–5.3)
POTASSIUM SERPL-SCNC: 4.2 MMOL/L — SIGNIFICANT CHANGE UP (ref 3.5–5.3)
PROT FLD-MCNC: 3.1 G/DL — SIGNIFICANT CHANGE UP
PROT SERPL-MCNC: 4.7 G/DL — LOW (ref 6–8.3)
PROTHROM AB SERPL-ACNC: 12.8 SEC — SIGNIFICANT CHANGE UP (ref 10–12.9)
RBC # BLD: 2.76 M/UL — LOW (ref 3.8–5.2)
RBC # BLD: 2.83 M/UL — LOW (ref 3.8–5.2)
RBC # FLD: 21.1 % — HIGH (ref 10.3–14.5)
RBC # FLD: 21.2 % — HIGH (ref 10.3–14.5)
RCV VOL RI: HIGH /UL (ref 0–5)
SODIUM SERPL-SCNC: 141 MMOL/L — SIGNIFICANT CHANGE UP (ref 135–145)
TOTAL NUCLEATED CELL COUNT, BODY FLUID: 581 /UL — HIGH (ref 0–5)
TUBE TYPE: SIGNIFICANT CHANGE UP
WBC # BLD: 6.25 K/UL — SIGNIFICANT CHANGE UP (ref 3.8–10.5)
WBC # BLD: 6.93 K/UL — SIGNIFICANT CHANGE UP (ref 3.8–10.5)
WBC # FLD AUTO: 6.25 K/UL — SIGNIFICANT CHANGE UP (ref 3.8–10.5)
WBC # FLD AUTO: 6.93 K/UL — SIGNIFICANT CHANGE UP (ref 3.8–10.5)

## 2018-11-03 PROCEDURE — 74018 RADEX ABDOMEN 1 VIEW: CPT | Mod: 26

## 2018-11-03 PROCEDURE — 99233 SBSQ HOSP IP/OBS HIGH 50: CPT

## 2018-11-03 PROCEDURE — 99223 1ST HOSP IP/OBS HIGH 75: CPT

## 2018-11-03 RX ORDER — SENNA PLUS 8.6 MG/1
2 TABLET ORAL AT BEDTIME
Qty: 0 | Refills: 0 | Status: DISCONTINUED | OUTPATIENT
Start: 2018-11-03 | End: 2018-11-07

## 2018-11-03 RX ORDER — DOCUSATE SODIUM 100 MG
100 CAPSULE ORAL
Qty: 0 | Refills: 0 | Status: DISCONTINUED | OUTPATIENT
Start: 2018-11-03 | End: 2018-11-07

## 2018-11-03 RX ORDER — MULTIVIT WITH MIN/MFOLATE/K2 340-15/3 G
240 POWDER (GRAM) ORAL ONCE
Qty: 0 | Refills: 0 | Status: DISCONTINUED | OUTPATIENT
Start: 2018-11-03 | End: 2018-11-07

## 2018-11-03 RX ADMIN — Medication 0.12 MILLIGRAM(S): at 05:45

## 2018-11-03 RX ADMIN — MIRTAZAPINE 15 MILLIGRAM(S): 45 TABLET, ORALLY DISINTEGRATING ORAL at 21:10

## 2018-11-03 NOTE — CONSULT NOTE ADULT - PROBLEM SELECTOR RECOMMENDATION 2
No evidence of atrial fibrillation. NSR. Low voltage on ECG. Will obtain Echo to evaluate pericardial space.

## 2018-11-03 NOTE — CONSULT NOTE ADULT - SUBJECTIVE AND OBJECTIVE BOX
All records reviewed.  Add info from daughter Rey [phone]    HPI:  79 y/o woman w hx uterine/ovarian ca post TAHBSO 2015 followed by [facility] Dr Epps and recently changed to Dr Topete([place] office, has not seen new doctor officially yet), traumatic brain injury 1.5yrs ago after fall w subsequent neuro/mental status deficits. Told at surgery still had some local LNs involvements, most recent CT scan ~6months ago w slight progression. Pt w progressive weight loss/anorexia/vague abdomen complaints (exact onset unknown), CT abdomen/pelvis here massive ascites, and pot 6 liters paracentesis.  Hgb on admission 7-8  Pt seen by [facility] NP day before admission w above complaints as well as bilious vomitus, post XRAY to r/o bowel obstruction and then to go to local ER for eval as sx persisted.      PAST MEDICAL & SURGICAL HISTORY:  Anemia  Atrial fibrillation: daughter reports no hx of afib, just palpitations.  Traumatic brain injury with loss of consciousness, sequela  History of loop recorder: implanted in 4/2017        Review of System:  see above  also constipation, rash on buttocks    MEDICATIONS  (STANDING):  digoxin     Tablet 0.125 milliGRAM(s) Oral daily  docusate sodium 100 milliGRAM(s) Oral two times a day  influenza   Vaccine 0.5 milliLiter(s) IntraMuscular once  metoprolol succinate ER 25 milliGRAM(s) Oral daily  mirtazapine 15 milliGRAM(s) Oral at bedtime  senna 2 Tablet(s) Oral at bedtime    MEDICATIONS  (PRN):  bisacodyl Suppository 10 milliGRAM(s) Rectal daily PRN Constipation  magnesium citrate Solution 240 milliLiter(s) Oral once PRN no bm  morphine  - Injectable 2 milliGRAM(s) IV Push every 4 hours PRN Moderate Pain (4 - 6)      Allergies    No Known Allergies    Intolerances        SOCIAL HISTORY:  d/c tobacco 15-20 yrs, unable to give duration or amount    FAMILY HISTORY:  Family history of heart disease (Mother)  Family history of cancer (Mother)      Vital Signs Last 24 Hrs  T(C): 36.9 (03 Nov 2018 17:35), Max: 36.9 (03 Nov 2018 05:26)  T(F): 98.5 (03 Nov 2018 17:35), Max: 98.5 (03 Nov 2018 09:18)  HR: 101 (03 Nov 2018 17:35) (93 - 106)  BP: 100/56 (03 Nov 2018 17:35) (84/41 - 108/56)  BP(mean): --  RR: 20 (03 Nov 2018 17:35) (17 - 20)  SpO2: 96% (03 Nov 2018 17:35) (94% - 100%)    PHYSICAL EXAM:      General:well developed well nourished, but very thin woman, sitting up in bed, in no acute distress  Eyes:sclera anicteric, pupils equal and reactive to light  ENMT:buccal mucosa moist, pharynx not injected  Neck:supple, trachea midline  Lungs:clear, no wheeze/rhonchi  Cardiovascular:regular rate and rhythm, S1 S2  Abdomen:soft, nontender, no organomegaly present, bowel sounds normal, sl pouchy  Neurological:alert and oriented x3, Cranial Nerves II-XII grossly intact  Skin:no increased ecchymosis/petechiae/purpura  Lymph Nodes:no palpable cervical/supraclavicular lymph nodes enlargements  Extremities:no cyanosis/clubbing/edema        LABS:                        7.0    6.93  )-----------( 344      ( 03 Nov 2018 16:22 )             23.7     11-03 @ 16:22  WBC6.93  RBC2.76 Hgb7.0 Hct23.7  MCV85.9  [id number]  Auto Neutro-- Band-- Auto Lymph-- Atypical Lymph-- Reactive Lymph-- Auto Mono-- Auto Eos-- Auto Baso--        11-03 @ 06:33  WBC6.25  RBC2.83 Hgb7.2 Hct24.2  MCV85.5  [id number]  Auto [place]46.3 Band-- Auto Lymph14.6 Atypical Lymph-- Reactive Lymph-- Auto Mono13.9 Auto Eos0.6 Auto Baso0.3        11-02 @ 14:01  WBC8.10  RBC3.41 Hgb8.8 Hct28.9  MCV84.8  [id number]  Auto Msfzkq11.2 Band-- Auto Lymph16.3 Atypical Lymph-- Reactive Lymph-- Auto Mono11.7 Auto Eos0.1 Auto Baso0.2          11-03    141  |  105  |  11  ----------------------------<  74  4.2   |  30  |  0.62    Ca    8.2<L>      03 Nov 2018 06:33  Phos  3.5     11-03  Mg     2.1     11-03    TPro  4.7<L>  /  Alb  1.9<L>  /  TBili  0.3  /  DBili  x   /  AST  15  /  ALT  12  /  AlkPhos  71  11-03 11-03 @ 06:33  PT12.8 INR1.17  PTT31.5  11-02 @ 14:01  PT12.3 INR1.12  PTT27.5        PERIPHERAL BLOOD SMEAR REVIEW:      RADIOLOGY & ADDITIONAL STUDIES:  < from: CT Abdomen and Pelvis w/ Oral Cont (11.02.18 @ 16:38) >    EXAM:  CT ABDOMEN AND PELVIS OC                                  PROCEDURE DATE:  11/02/2018          INTERPRETATION:  Clinical information: Abdominal pain and distention.    Axial images obtained, coronal and sagittal images computer reformatted.   Comparison exam dated 12/28/2017.    Oral contrast material administered. IV contrast material not   administered limiting evaluation.        Minimal effusion right lung base. Coronary artery calcifications present.    Massive intra-abdominal and intrapelvic ascites present. Hypodensity in   the right hepatic lobe unchanged prior exam have the appearance of a   lobulated cyst. The spleen is not enlarged. Splenic calcified granulomas   present. Atrophic pancreas. Poor definition of kidneys and adrenal glands   due to the massive ascites. No aortic aneurysm. Soft tissue densities in   the retroperitoneal region poorly defined due to lack of IV contrast and    the massive ascites, representing adenopathy. No biliary ductal   dilatation.    No bowel obstruction. Orally ingested contrast filled small bowel but has   not yet reached the large bowel. Pelvic sidewall adenopathy, presacral   adenopathy, these findings are   poorly defined due to lack of IV   contrast and the massive ascites.    Urinary bladder is poorly defined. Inguinal regions intact. No acute   appearing osseous abnormalities.    IMPRESSION:    Massive intra-abdominal and intrapelvic ascites. See above report.      < end of copied text >  < from: CT Abdomen and Pelvis w/ Oral Cont and w/ IV Cont (12.28.17 @ 11:19) >    EXAM:  CT ABDOMEN AND PELVIS OC IC        PROCEDURE DATE:  12/28/2017           INTERPRETATION:  CLINICAL INFORMATION: Cyst on liver, fluid in pelvis.   Lymphoma.    COMPARISON: None.    PROCEDURE:   CT of the Abdomen and Pelvis was performed with intravenous contrast.   Intravenous contrast: 90 mL Omnipaque 350. 10 mL discarded.  Oral contrast: positive contrast was administered.  Sagittal and coronal reformats were performed.    FINDINGS:    LOWER CHEST: Within normal limits.    LIVER: Hepatic cysts.  BILE DUCTS: Normal caliber.   GALLBLADDER: Within normal limits.  SPLEEN: Within normal limits.  PANCREAS: Within normal limits.  ADRENALS: Within normal limits.  KIDNEYS/URETERS: Within normal limits.     BLADDER: Within normal limits.  REPRODUCTIVE ORGANS: Status post hysterectomy.    BOWEL: No bowel obstruction.   PERITONEUM: Small volume abdominopelvic ascites.  RETROPERITONEUM/VESSELS: Infiltrative soft tissue and retroperitoneal   adenopathy extending from below the diaphragm to the presacral space   encasing the vessels arising from the celiac axis and both renal   arteries. Atherosclerotic calcifications.  ABDOMINAL WALL: Within normal limits.  BONES: Degenerative changes of the spine.    IMPRESSION:   Infiltrative soft tissue and retroperitoneal adenopathy extending from   below the diaphragm to the presacral space encasing the vessels arising   from the celiac axis and both renal arteries, consistent with known   lymphoma.  Small volume of abdominopelvic ascites.      < end of copied text >    < from: Xray Chest 2 Views PA/Lat (11.02.18 @ 14:54) >    PROCEDURE DATE:  11/02/2018          INTERPRETATION:  Clinical information: Abdominal distention    Comparison view of the chest dated 8/6/2018.    2 views, frontal and lateral.        Cardiac loop recorder device present. The aorta shows atherosclerotic   changes. The lungs are clear. There is no sign of infiltrate effusion or   congestive failure. Heart size is within normal limits. Hilar regions and   mediastinal contours are unremarkable.    The bony thorax appears intact.    IMPRESSION:    No active disease.    < end of copied text >

## 2018-11-03 NOTE — CONSULT NOTE ADULT - SUBJECTIVE AND OBJECTIVE BOX
PCP:    REQUESTING PHYSICIAN:    REASON FOR CONSULT:    CHIEF COMPLAINT:    HPI:  78F with unknown primary CA s/p hysterectomy in 2015 (with some lymph nodes still present), hx of syncope with a TBI in 2017 with resulting mild cognitive impairment who presents with abdominal distension and pain.  Patient has been suffering off and on with abdominal discomfort for awhile but for the past week her abdomen has grown in size.  With the increase in size her discomfort has now progressed to pain.  Patient also now has started to experience nausea and bilious nonbloody vomiting.  Patient went to her NP yesterday and they were concerned for a blockage and had an xray.  Since the symptoms did not irma, patient was told to come here for further evaluation and treatment.  In the ED, patient was found to massive ascites on CT.  Also found to have anemia with hgb of 8.8. (02 Nov 2018 20:15)  Asked to see patient to evaluate for arrhythmia. Pt is in no distress. She denies chest pain. She reports mild dyspnea lately. She has seen a cardiologist in the past but dtr denies any acute cardiac issues. She is in NSR today      PAST MEDICAL & SURGICAL HISTORY:  Anemia  Atrial fibrillation: daughter reports no hx of afib, just palpitations.  Stomach cancer  Traumatic brain injury with loss of consciousness, sequela  History of loop recorder: implanted in 4/2017  H/O: hysterectomy: 2015      Allergies    No Known Allergies    Intolerances        SOCIAL HISTORY:    FAMILY HISTORY:  Family history of heart disease (Mother)  Family history of cancer in mother (Mother)      MEDICATIONS:  MEDICATIONS  (STANDING):  digoxin     Tablet 0.125 milliGRAM(s) Oral daily  docusate sodium 100 milliGRAM(s) Oral two times a day  influenza   Vaccine 0.5 milliLiter(s) IntraMuscular once  metoprolol succinate ER 25 milliGRAM(s) Oral daily  mirtazapine 15 milliGRAM(s) Oral at bedtime  senna 2 Tablet(s) Oral at bedtime    MEDICATIONS  (PRN):  bisacodyl Suppository 10 milliGRAM(s) Rectal daily PRN Constipation  magnesium citrate Solution 240 milliLiter(s) Oral once PRN no bm  morphine  - Injectable 2 milliGRAM(s) IV Push every 4 hours PRN Moderate Pain (4 - 6)      REVIEW OF SYSTEMS:    CONSTITUTIONAL: No weakness, fevers or chills  EYES/ENT: No visual changes;  No vertigo or throat pain   NECK: No pain or stiffness  RESPIRATORY: No cough, wheezing, hemoptysis; No shortness of breath  CARDIOVASCULAR: No chest pain or palpitations  GASTROINTESTINAL: abdominal discomfort prior to paracentesis.   GENITOURINARY: No dysuria, frequency or hematuria  NEUROLOGICAL: No numbness or weakness  SKIN: No itching, burning, rashes, or lesions   All other review of systems is negative unless indicated above    Vital Signs Last 24 Hrs  T(C): 36.9 (03 Nov 2018 17:35), Max: 36.9 (03 Nov 2018 05:26)  T(F): 98.5 (03 Nov 2018 17:35), Max: 98.5 (03 Nov 2018 09:18)  HR: 101 (03 Nov 2018 17:35) (93 - 106)  BP: 100/56 (03 Nov 2018 17:35) (84/41 - 108/56)  BP(mean): --  RR: 20 (03 Nov 2018 17:35) (17 - 20)  SpO2: 96% (03 Nov 2018 17:35) (94% - 100%)    I&O's Summary    02 Nov 2018 07:01  -  03 Nov 2018 07:00  --------------------------------------------------------  IN: 0 mL / OUT: 400 mL / NET: -400 mL        PHYSICAL EXAM:    Constitutional: NAD, awake thin  HEENT: PERR, EOMI,  No oral cyananosis.  Neck:  supple,  No JVD  Respiratory: Breath sounds are clear bilaterally, No wheezing, rales or rhonchi  Cardiovascular: S1 and S2, regular rate and rhythm, no Murmurs, gallops or rubs  Gastrointestinal: Bowel Sounds present, soft, nontender. 1/6 YENIFER  Extremities: No peripheral edema. No clubbing or cyanosis.  Vascular: 2+ peripheral pulses  Neurological: A/O x 3, no focal deficits Mild cognitive impairment  Musculoskeletal: no calf tenderness.  Skin: No rashes.      LABS: All Labs Reviewed:                        7.0    6.93  )-----------( 344      ( 03 Nov 2018 16:22 )             23.7                         7.2    6.25  )-----------( 316      ( 03 Nov 2018 06:33 )             24.2                         8.8    8.10  )-----------( 395      ( 02 Nov 2018 14:01 )             28.9     03 Nov 2018 06:33    141    |  105    |  11     ----------------------------<  74     4.2     |  30     |  0.62   02 Nov 2018 14:01    140    |  102    |  11     ----------------------------<  101    4.4     |  33     |  0.60     Ca    8.2        03 Nov 2018 06:33  Ca    8.7        02 Nov 2018 14:01  Phos  3.5       03 Nov 2018 06:33  Mg     2.1       03 Nov 2018 06:33    TPro  4.7    /  Alb  1.9    /  TBili  0.3    /  DBili  x      /  AST  15     /  ALT  12     /  AlkPhos  71     03 Nov 2018 06:33  TPro  5.5    /  Alb  1.9    /  TBili  0.4    /  DBili  x      /  AST  22     /  ALT  14     /  AlkPhos  97     02 Nov 2018 14:01    PT/INR - ( 03 Nov 2018 06:33 )   PT: 12.8 sec;   INR: 1.17 ratio         PTT - ( 03 Nov 2018 06:33 )  PTT:31.5 sec      Blood Culture:         RADIOLOGY/EKG:  NSR low voltage, no acute injury

## 2018-11-03 NOTE — PROGRESS NOTE ADULT - ATTENDING COMMENTS
Advanced care planning was discussed with patient and family.  Advanced care planning forms were reviewed and discussed.  Risks, benefits and alternatives of gastroenterologic procedures were discussed in detail and all questions were answered.    30 minutes spent.
Prognosis GUARDED overall.

## 2018-11-03 NOTE — CONSULT NOTE ADULT - SUBJECTIVE AND OBJECTIVE BOX
Date/Time Patient Seen:  		  Referring MD:   Data Reviewed	       Patient is a 78y old  Female who presents with a chief complaint of abdominal pain (03 Nov 2018 17:50)      Subjective/HPI    in bed  seen and examined  vs and meds reviewed  spoke with pt and children  pt is getting PRBC now  H and P reviewed  ER provider note reviewed    labs and imaging reviewed    H&P Adult [Charted Location: [facility]] [Authored: 02-Nov-2018 20:15]- for Visit: [id number], Complete, Revised, Signed in Full, General    History and Physical:   Outpatient Providers	[facility] - PMD     Language:  · Patient/Family of Limited English Proficiency	No       History of Present Illness:  Reason for Admission: abdominal pain  History of Present Illness:   78F with unknown primary CA s/p hysterectomy in 2015 (with some lymph nodes still present), hx of syncope with a TBI in 2017 with resulting mild cognitive impairment who presents with abdominal distension and pain.  Patient has been suffering off and on with abdominal discomfort for awhile but for the past week her abdomen has grown in size.  With the increase in size her discomfort has now progressed to pain.  Patient also now has started to experience nausea and bilious nonbloody vomiting.  Patient went to her NP yesterday and they were concerned for a blockage and had an xray.  Since the symptoms did not irma, patient was told to come here for further evaluation and treatment.  In the ED, patient was found to massive ascites on CT.  Also found to have anemia with hgb of 8.8.            PAST MEDICAL & SURGICAL HISTORY:  Anemia  Atrial fibrillation: daughter reports no hx of afib, just palpitations.  Stomach cancer  Traumatic brain injury with loss of consciousness, sequela  History of loop recorder: implanted in 4/2017  H/O: hysterectomy: 2015  No significant past surgical history        Medication list         MEDICATIONS  (STANDING):  digoxin     Tablet 0.125 milliGRAM(s) Oral daily  docusate sodium 100 milliGRAM(s) Oral two times a day  influenza   Vaccine 0.5 milliLiter(s) IntraMuscular once  metoprolol succinate ER 25 milliGRAM(s) Oral daily  mirtazapine 15 milliGRAM(s) Oral at bedtime  senna 2 Tablet(s) Oral at bedtime    MEDICATIONS  (PRN):  bisacodyl Suppository 10 milliGRAM(s) Rectal daily PRN Constipation  magnesium citrate Solution 240 milliLiter(s) Oral once PRN no bm  morphine  - Injectable 2 milliGRAM(s) IV Push every 4 hours PRN Moderate Pain (4 - 6)         Vitals log        ICU Vital Signs Last 24 Hrs  T(C): 36.9 (03 Nov 2018 17:35), Max: 36.9 (03 Nov 2018 05:26)  T(F): 98.5 (03 Nov 2018 17:35), Max: 98.5 (03 Nov 2018 09:18)  HR: 101 (03 Nov 2018 17:35) (93 - 106)  BP: 100/56 (03 Nov 2018 17:35) (84/41 - 107/60)  BP(mean): --  ABP: --  ABP(mean): --  RR: 20 (03 Nov 2018 17:35) (18 - 20)  SpO2: 96% (03 Nov 2018 17:35) (94% - 100%)           Input and Output:  I&O's Detail    02 Nov 2018 07:01  -  03 Nov 2018 07:00  --------------------------------------------------------  IN:  Total IN: 0 mL    OUT:    Voided: 400 mL  Total OUT: 400 mL    Total NET: -400 mL          Lab Data                        7.0    6.93  )-----------( 344      ( 03 Nov 2018 16:22 )             23.7     11-03    141  |  105  |  11  ----------------------------<  74  4.2   |  30  |  0.62    Ca    8.2<L>      03 Nov 2018 06:33  Phos  3.5     11-03  Mg     2.1     11-03    TPro  4.7<L>  /  Alb  1.9<L>  /  TBili  0.3  /  DBili  x   /  AST  15  /  ALT  12  /  AlkPhos  71  11-03          lives at home  6 children  retired      Review of Systems	  weak  forgetful      Objective     Physical Examination    heart s1s2  lung dec BS  abd soft  cn grossly int  frail  weak      Pertinent Lab findings & Imaging      Cooper:  NO   Adequate UO     I&O's Detail    02 Nov 2018 07:01  -  03 Nov 2018 07:00  --------------------------------------------------------  IN:  Total IN: 0 mL    OUT:    Voided: 400 mL  Total OUT: 400 mL    Total NET: -400 mL               Discussed with:     Cultures:	        Radiology

## 2018-11-03 NOTE — CONSULT NOTE ADULT - PROBLEM SELECTOR RECOMMENDATION 9
ascites  advanced cancer  hx of TBI  frail and weak, failure to thrive  Heme onc eval noted  discussed condition with daughter and son and patient at the bedside, pt is not a decision maker at this point, her oldest two children at the bedside share HCP  at present, remains FULL CODE, family is willing to consider and entertain Home Hospice  prognosis poor  not in pain right now  getting PRBC  will follow and monitor sx and or distress

## 2018-11-03 NOTE — CONSULT NOTE ADULT - ASSESSMENT
77 y/o woman w hx uterine/ovarian ca post TAHBSO 2015 followed by [facility] Dr Epps and recently changed to Dr Topete([place] office, has not seen new doctor officially yet), traumatic brain injury 1.5yrs ago after fall w subsequent neuro/mental status deficits. Told at surgery still had some local LNs involvements, most recent CT scan ~6months ago w slight progression. Pt w progressive weight loss/anorexia/vague abdomen complaints (exact onset unknown), CT abdomen/pelvis here massive ascites, and pot 6 liters paracentesis.  Hgb on admission 7-8  Pt seen by [facility] NP day before admission w above complaints as well as bilious vomitus, post XRAY to r/o bowel obstruction and then to go to local ER for eval as sx persisted.    CT a/p on admission w massive ascites unable to discern lymphadenopathies etc, but CT 12/2017 noted extensive retroperitoneal soft tissue encasing local vessels.  await cytology of ascites, but highly suspect for malignant ascites.  discussed at length w anna Le.  pt not curable, poor performance status, not candidate for aggressive intervention, recommend supportive and comfort care, Hospice Care. Anna Le agrees and is open to option of Home Hospice Care.  will consult palliative care, Hospice referral,  anna Amato [phone]    thank you, will follow w you.

## 2018-11-04 DIAGNOSIS — S06.9X9S UNSPECIFIED INTRACRANIAL INJURY WITH LOSS OF CONSCIOUSNESS OF UNSPECIFIED DURATION, SEQUELA: ICD-10-CM

## 2018-11-04 LAB
ALBUMIN SERPL ELPH-MCNC: 1.7 G/DL — LOW (ref 3.3–5)
ALP SERPL-CCNC: 71 U/L — SIGNIFICANT CHANGE UP (ref 30–120)
ALT FLD-CCNC: 10 U/L DA — SIGNIFICANT CHANGE UP (ref 10–60)
ANION GAP SERPL CALC-SCNC: 5 MMOL/L — SIGNIFICANT CHANGE UP (ref 5–17)
AST SERPL-CCNC: 16 U/L — SIGNIFICANT CHANGE UP (ref 10–40)
BILIRUB SERPL-MCNC: 0.5 MG/DL — SIGNIFICANT CHANGE UP (ref 0.2–1.2)
BUN SERPL-MCNC: 9 MG/DL — SIGNIFICANT CHANGE UP (ref 7–23)
CALCIUM SERPL-MCNC: 7.9 MG/DL — LOW (ref 8.4–10.5)
CHLORIDE SERPL-SCNC: 105 MMOL/L — SIGNIFICANT CHANGE UP (ref 96–108)
CO2 SERPL-SCNC: 31 MMOL/L — SIGNIFICANT CHANGE UP (ref 22–31)
CREAT SERPL-MCNC: 0.56 MG/DL — SIGNIFICANT CHANGE UP (ref 0.5–1.3)
GLUCOSE SERPL-MCNC: 92 MG/DL — SIGNIFICANT CHANGE UP (ref 70–99)
HCT VFR BLD CALC: 29.1 % — LOW (ref 34.5–45)
HGB BLD-MCNC: 8.9 G/DL — LOW (ref 11.5–15.5)
MCHC RBC-ENTMCNC: 26 PG — LOW (ref 27–34)
MCHC RBC-ENTMCNC: 30.6 GM/DL — LOW (ref 32–36)
MCV RBC AUTO: 85.1 FL — SIGNIFICANT CHANGE UP (ref 80–100)
NRBC # BLD: 0 /100 WBCS — SIGNIFICANT CHANGE UP (ref 0–0)
PLATELET # BLD AUTO: 304 K/UL — SIGNIFICANT CHANGE UP (ref 150–400)
POTASSIUM SERPL-MCNC: 4.1 MMOL/L — SIGNIFICANT CHANGE UP (ref 3.5–5.3)
POTASSIUM SERPL-SCNC: 4.1 MMOL/L — SIGNIFICANT CHANGE UP (ref 3.5–5.3)
PROT SERPL-MCNC: 4.3 G/DL — LOW (ref 6–8.3)
RBC # BLD: 3.42 M/UL — LOW (ref 3.8–5.2)
RBC # FLD: 19.6 % — HIGH (ref 10.3–14.5)
SODIUM SERPL-SCNC: 141 MMOL/L — SIGNIFICANT CHANGE UP (ref 135–145)
WBC # BLD: 6.35 K/UL — SIGNIFICANT CHANGE UP (ref 3.8–10.5)
WBC # FLD AUTO: 6.35 K/UL — SIGNIFICANT CHANGE UP (ref 3.8–10.5)

## 2018-11-04 PROCEDURE — 93306 TTE W/DOPPLER COMPLETE: CPT | Mod: 26

## 2018-11-04 PROCEDURE — 99233 SBSQ HOSP IP/OBS HIGH 50: CPT

## 2018-11-04 RX ADMIN — MIRTAZAPINE 15 MILLIGRAM(S): 45 TABLET, ORALLY DISINTEGRATING ORAL at 21:31

## 2018-11-04 RX ADMIN — SENNA PLUS 2 TABLET(S): 8.6 TABLET ORAL at 21:31

## 2018-11-04 RX ADMIN — Medication 25 MILLIGRAM(S): at 06:24

## 2018-11-04 RX ADMIN — Medication 0.12 MILLIGRAM(S): at 06:24

## 2018-11-04 RX ADMIN — Medication 100 MILLIGRAM(S): at 17:07

## 2018-11-04 NOTE — PROGRESS NOTE ADULT - ASSESSMENT
77 y/o woman w hx uterine/ovarian ca post TAHBSO 2015 followed by [facility] Dr Epps and recently changed to Dr Topete([place] office, has not seen new doctor officially yet), traumatic brain injury 1.5yrs ago after fall w subsequent neuro/mental status deficits. Told at surgery still had some local LNs involvements, most recent CT scan ~6months ago w slight progression. Pt w progressive weight loss/anorexia/vague abdomen complaints (exact onset unknown), seen by [facility] NP day before admission w above complaints as well as bilious vomitus, post XRAY to r/o bowel obstruction and then to go to local ER for eval as sx persisted.  CT a/p on admission w massive ascites unable to discern lymphadenopathies etc, but CT 12/2017 noted extensive retroperitoneal soft tissue encasing local vessels.  Post 6 liters paracentesis.  Hgb on admission 7-8, now post 1 unit PRBC w appropriate increase       -await cytology of ascites, but highly suspect for malignant ascites.  -pt not curable, poor performance status, not candidate for aggressive intervention, recommend supportive and comfort care, Hospice Care.  -daughter Rey Amato in agreement  -consulted Palliative Care for Home Hospice eval,  Arlene Amato

## 2018-11-05 LAB
ALBUMIN SERPL ELPH-MCNC: 1.6 G/DL — LOW (ref 3.3–5)
ALP SERPL-CCNC: 69 U/L — SIGNIFICANT CHANGE UP (ref 30–120)
ALT FLD-CCNC: 11 U/L DA — SIGNIFICANT CHANGE UP (ref 10–60)
ANION GAP SERPL CALC-SCNC: 5 MMOL/L — SIGNIFICANT CHANGE UP (ref 5–17)
AST SERPL-CCNC: 15 U/L — SIGNIFICANT CHANGE UP (ref 10–40)
BILIRUB SERPL-MCNC: 0.3 MG/DL — SIGNIFICANT CHANGE UP (ref 0.2–1.2)
BUN SERPL-MCNC: 9 MG/DL — SIGNIFICANT CHANGE UP (ref 7–23)
CALCIUM SERPL-MCNC: 7.9 MG/DL — LOW (ref 8.4–10.5)
CHLORIDE SERPL-SCNC: 104 MMOL/L — SIGNIFICANT CHANGE UP (ref 96–108)
CO2 SERPL-SCNC: 30 MMOL/L — SIGNIFICANT CHANGE UP (ref 22–31)
CREAT SERPL-MCNC: 0.53 MG/DL — SIGNIFICANT CHANGE UP (ref 0.5–1.3)
GLUCOSE SERPL-MCNC: 110 MG/DL — HIGH (ref 70–99)
HCT VFR BLD CALC: 28 % — LOW (ref 34.5–45)
HGB BLD-MCNC: 8.7 G/DL — LOW (ref 11.5–15.5)
MCHC RBC-ENTMCNC: 26.6 PG — LOW (ref 27–34)
MCHC RBC-ENTMCNC: 31.1 GM/DL — LOW (ref 32–36)
MCV RBC AUTO: 85.6 FL — SIGNIFICANT CHANGE UP (ref 80–100)
NRBC # BLD: 0 /100 WBCS — SIGNIFICANT CHANGE UP (ref 0–0)
PLATELET # BLD AUTO: 357 K/UL — SIGNIFICANT CHANGE UP (ref 150–400)
POTASSIUM SERPL-MCNC: 4.2 MMOL/L — SIGNIFICANT CHANGE UP (ref 3.5–5.3)
POTASSIUM SERPL-SCNC: 4.2 MMOL/L — SIGNIFICANT CHANGE UP (ref 3.5–5.3)
PROT SERPL-MCNC: 4.4 G/DL — LOW (ref 6–8.3)
RBC # BLD: 3.27 M/UL — LOW (ref 3.8–5.2)
RBC # FLD: 20 % — HIGH (ref 10.3–14.5)
SODIUM SERPL-SCNC: 139 MMOL/L — SIGNIFICANT CHANGE UP (ref 135–145)
WBC # BLD: 6.53 K/UL — SIGNIFICANT CHANGE UP (ref 3.8–10.5)
WBC # FLD AUTO: 6.53 K/UL — SIGNIFICANT CHANGE UP (ref 3.8–10.5)

## 2018-11-05 PROCEDURE — 99233 SBSQ HOSP IP/OBS HIGH 50: CPT

## 2018-11-05 RX ADMIN — MIRTAZAPINE 15 MILLIGRAM(S): 45 TABLET, ORALLY DISINTEGRATING ORAL at 21:18

## 2018-11-05 RX ADMIN — Medication 100 MILLIGRAM(S): at 05:48

## 2018-11-05 RX ADMIN — Medication 25 MILLIGRAM(S): at 05:48

## 2018-11-05 RX ADMIN — SENNA PLUS 2 TABLET(S): 8.6 TABLET ORAL at 21:18

## 2018-11-05 RX ADMIN — Medication 100 MILLIGRAM(S): at 19:01

## 2018-11-05 NOTE — PROGRESS NOTE ADULT - ASSESSMENT
79 y/o woman w hx uterine/ovarian ca post TAHBSO 2015 followed by [facility] Dr Epps and recently changed to Dr Topete ([place] office, has not seen new doctor officially yet), traumatic brain injury 1.5yrs ago after fall w subsequent neuro/mental status deficits. Told at surgery still had some local LNs involvements, most recent CT scan ~6months ago w slight progression. Pt w progressive weight loss/anorexia/vague abdomen complaints (exact onset unknown), seen by [facility] NP day before admission w above complaints as well as bilious vomitus, post XRAY to r/o bowel obstruction and then to go to local ER for eval as sx persisted.  - CT a/p on admission w massive ascites unable to discern lymphadenopathies etc, but CT 12/2017 noted extensive retroperitoneal soft tissue encasing local vessels.  - Post 6 liters paracentesis; pathology/cytology still pending but highly suspect for malignant ascites.  - pt not curable, poor performance status, not candidate for aggressive intervention, recommend supportive and comfort care  - hospice evaluation still pending; family at bedside in agreement  -consulted Palliative Care for Home Hospice eval,  Arlene Amato  - no additional heme/onc interventions indicated; will sign off; reconsult if needed

## 2018-11-06 ENCOUNTER — TRANSCRIPTION ENCOUNTER (OUTPATIENT)
Age: 78
End: 2018-11-06

## 2018-11-06 LAB
ALBUMIN SERPL ELPH-MCNC: 1.6 G/DL — LOW (ref 3.3–5)
ALP SERPL-CCNC: 74 U/L — SIGNIFICANT CHANGE UP (ref 30–120)
ALT FLD-CCNC: 15 U/L DA — SIGNIFICANT CHANGE UP (ref 10–60)
ANION GAP SERPL CALC-SCNC: 2 MMOL/L — LOW (ref 5–17)
AST SERPL-CCNC: 28 U/L — SIGNIFICANT CHANGE UP (ref 10–40)
BILIRUB SERPL-MCNC: 0.2 MG/DL — SIGNIFICANT CHANGE UP (ref 0.2–1.2)
BUN SERPL-MCNC: 8 MG/DL — SIGNIFICANT CHANGE UP (ref 7–23)
CALCIUM SERPL-MCNC: 7.8 MG/DL — LOW (ref 8.4–10.5)
CHLORIDE SERPL-SCNC: 104 MMOL/L — SIGNIFICANT CHANGE UP (ref 96–108)
CO2 SERPL-SCNC: 32 MMOL/L — HIGH (ref 22–31)
CREAT SERPL-MCNC: 0.44 MG/DL — LOW (ref 0.5–1.3)
GLUCOSE SERPL-MCNC: 109 MG/DL — HIGH (ref 70–99)
HCT VFR BLD CALC: 28.6 % — LOW (ref 34.5–45)
HGB BLD-MCNC: 8.8 G/DL — LOW (ref 11.5–15.5)
MCHC RBC-ENTMCNC: 26.4 PG — LOW (ref 27–34)
MCHC RBC-ENTMCNC: 30.8 GM/DL — LOW (ref 32–36)
MCV RBC AUTO: 85.9 FL — SIGNIFICANT CHANGE UP (ref 80–100)
NRBC # BLD: 0 /100 WBCS — SIGNIFICANT CHANGE UP (ref 0–0)
PLATELET # BLD AUTO: 287 K/UL — SIGNIFICANT CHANGE UP (ref 150–400)
POTASSIUM SERPL-MCNC: 4.3 MMOL/L — SIGNIFICANT CHANGE UP (ref 3.5–5.3)
POTASSIUM SERPL-SCNC: 4.3 MMOL/L — SIGNIFICANT CHANGE UP (ref 3.5–5.3)
PROT SERPL-MCNC: 4.1 G/DL — LOW (ref 6–8.3)
RBC # BLD: 3.33 M/UL — LOW (ref 3.8–5.2)
RBC # FLD: 20.1 % — HIGH (ref 10.3–14.5)
SODIUM SERPL-SCNC: 138 MMOL/L — SIGNIFICANT CHANGE UP (ref 135–145)
WBC # BLD: 6.98 K/UL — SIGNIFICANT CHANGE UP (ref 3.8–10.5)
WBC # FLD AUTO: 6.98 K/UL — SIGNIFICANT CHANGE UP (ref 3.8–10.5)

## 2018-11-06 PROCEDURE — 99232 SBSQ HOSP IP/OBS MODERATE 35: CPT

## 2018-11-06 PROCEDURE — 12345: CPT | Mod: NC

## 2018-11-06 RX ADMIN — MIRTAZAPINE 15 MILLIGRAM(S): 45 TABLET, ORALLY DISINTEGRATING ORAL at 21:36

## 2018-11-06 RX ADMIN — Medication 25 MILLIGRAM(S): at 17:23

## 2018-11-06 RX ADMIN — Medication 100 MILLIGRAM(S): at 05:39

## 2018-11-06 RX ADMIN — SENNA PLUS 2 TABLET(S): 8.6 TABLET ORAL at 21:36

## 2018-11-06 RX ADMIN — Medication 100 MILLIGRAM(S): at 17:10

## 2018-11-06 NOTE — DISCHARGE NOTE ADULT - ADDITIONAL INSTRUCTIONS
see your regular oncologist if c/w GOC see your primary outpatient oncologist if c/w GOC and/or primary care provider wtihin 1 week of discharge  pt and family responsible to make and attend all follow up appts for ongoing care and managment

## 2018-11-06 NOTE — GOALS OF CARE CONVERSATION - PERSONAL ADVANCE DIRECTIVE - CONVERSATION DETAILS
at bedside pt awake.Due to head injury he answers all questions as her proxy.Wants all done at this time full code.He will discuss with his children.

## 2018-11-06 NOTE — CHART NOTE - NSCHARTNOTEFT_GEN_A_CORE
patient reports occasional abdominal cramping; abdomen more bloated.  some family members feel she should stay in hospital for further monitoring and symptoms mgmt.  Hospice eval done today but no final decision from family yet about home hospice.  d/w GI - patient likely to reaccumulate malignant ascites - not much to offer clinically.  hopefully, family will make a decision by tomorrow for home hospice/comfort care.  d/w team

## 2018-11-06 NOTE — PROGRESS NOTE ADULT - PROBLEM SELECTOR PLAN 2
,may need a repeat  f/u cell count cultures   ppi once a day  may need a repeat  monitor cbc  advance diet and d/c pt if stable  in and  out
,may need a repeat  f/u cell count cultures   ppi once a da  may need a repeat  monitor cbc  advance diet and d/c pt if stable  in and  out
,may need a repeat  f/u cell count cultures   ppi once a da  may need a repeat  monitor cbc  advance diet and d/c pt if stable  in and  out
,may need a repeat  f/u cell count cultures   ppi once a day  may need a repeat  monitor cbc  advance diet and d/c pt if stable  in and  out
Hb improved after PRBCs
f/up hb at noon - transfuse if hb<7
hb improved after PRBCs

## 2018-11-06 NOTE — DISCHARGE NOTE ADULT - CARE PROVIDER_API CALL
[facility],   [place]  Phone: (   )    -  Fax: (   )    - [facility] [place],   [place]  Phone: (   )    -  Fax: (   )    -    Zeke Brumfield (MD), Family Medicine  [address] [place] [address]  Phone: [phone]  Fax: [phone]

## 2018-11-06 NOTE — PROGRESS NOTE ADULT - ASSESSMENT
Assessment and Plan:   · Assessment		  78F with unknown primary CA s/p hysterectomy in 2015 (with some lymph nodes still present), hx of syncope with a TBI in 2017 with resulting mild cognitive impairment who presents with abdominal distension and pain.       Problem/Plan - 1:  ·  Problem: Malignant ascites.  Plan: s/p 6 liter paracentesis 11/2 - abdominal discomfort improved since then   - f/up GI/onc recs.  - Unknown primary - not a chemo candidate.      Problem/Plan - 2:  ·  Problem: Anemia.  Plan: hb improved after PRBCs.   - Monitor H/H     Problem/Plan - 3:  ·  Problem: Cardiac arrhythmia, unspecified cardiac arrhythmia type.    Plan: home meds as per cardiology.      Problem/Plan - 4:  ·  Problem: Traumatic brain injury with loss of consciousness, sequela.    Plan: decision-making as per family.      Problem/Plan - 5:  ·  Problem: Preventive measure.  Plan: IMPROVE VTE Individual Risk Assessment Assessment and Plan:   · Assessment		  78F with unknown primary CA s/p hysterectomy in 2015 (with some lymph nodes still present), hx of syncope with a TBI in 2017 with resulting mild cognitive impairment who presents with abdominal distension and pain.       Problem/Plan - 1:  ·  Problem: Malignant ascites.  Plan: s/p 6 liter paracentesis 11/2 - abdominal discomfort improved since then   - f/up GI/onc recs.  - Unknown primary - not a chemo candidate.      Problem/Plan - 2:  ·  Problem: Anemia.  Plan: hb improved after PRBCs.   - Monitor H/H     Problem/Plan - 3:  ·  Problem: Cardiac arrhythmia, unspecified cardiac arrhythmia type.    Plan: home meds as per cardiology.      Problem/Plan - 4:  ·  Problem: Traumatic brain injury with loss of consciousness, sequela.    Plan: decision-making as per family.      Problem/Plan - 5:  ·  Problem: Preventive measure.  Plan: IMPROVE VTE Individual Risk Assessment  - SCD to continue

## 2018-11-06 NOTE — DISCHARGE NOTE ADULT - PROVIDER TOKENS
FREE:[LAST:[[facility] [place]],PHONE:[(   )    -],FAX:[(   )    -],ADDRESS:[[place]]] FREE:[LAST:[[facility] [place]],PHONE:[(   )    -],FAX:[(   )    -],ADDRESS:[[place]]],TOKEN:'[id number]'

## 2018-11-06 NOTE — DISCHARGE NOTE ADULT - CARE PROVIDERS DIRECT ADDRESSES
,DirectAddress_Unknown ,DirectAddress_Unknown,[email or web address]

## 2018-11-06 NOTE — DISCHARGE NOTE ADULT - CARE PLAN
Principal Discharge DX:	Abdominal pain, unspecified abdominal location  Goal:	stable  Assessment and plan of treatment:	-likely from cancer metasasis, apprec palliative recommendations: roxanol for pain maangement with bowel regiment, pt and family will decide ultimately on hospice when appropiate  Secondary Diagnosis:	Chronic atrial fibrillation  Goal:	stable  Assessment and plan of treatment:	-cont home medication, follow up with your primary medical provider and/or cardiologist within 1-2 weeks

## 2018-11-06 NOTE — DISCHARGE NOTE ADULT - PLAN OF CARE
stable -likely from cancer metasasis, apprec palliative recommendations: roxanol for pain maangement with bowel regiment, pt and family will decide ultimately on hospice when appropiate -cont home medication, follow up with your primary medical provider and/or cardiologist within 1-2 weeks

## 2018-11-06 NOTE — DISCHARGE NOTE ADULT - MEDICATION SUMMARY - MEDICATIONS TO TAKE
I will START or STAY ON the medications listed below when I get home from the hospital:    digoxin 125 mcg (0.125 mg) oral tablet  -- 1 tab(s) by mouth once a day  -- Indication: For Atrial fibrillation    metoprolol tartrate 25 mg oral tablet  -- orally once a day  -- Indication: For Atrial fibrillation

## 2018-11-06 NOTE — DISCHARGE NOTE ADULT - PATIENT PORTAL LINK FT
You can access the [facility] Patient Portal, offered by [facility], by registering with the following website: [email or web address]

## 2018-11-07 VITALS
RESPIRATION RATE: 16 BRPM | DIASTOLIC BLOOD PRESSURE: 60 MMHG | OXYGEN SATURATION: 98 % | SYSTOLIC BLOOD PRESSURE: 94 MMHG | TEMPERATURE: 98 F | HEART RATE: 94 BPM

## 2018-11-07 PROCEDURE — 82150 ASSAY OF AMYLASE: CPT

## 2018-11-07 PROCEDURE — 87075 CULTR BACTERIA EXCEPT BLOOD: CPT

## 2018-11-07 PROCEDURE — 86923 COMPATIBILITY TEST ELECTRIC: CPT

## 2018-11-07 PROCEDURE — 96374 THER/PROPH/DIAG INJ IV PUSH: CPT

## 2018-11-07 PROCEDURE — 99285 EMERGENCY DEPT VISIT HI MDM: CPT | Mod: 25

## 2018-11-07 PROCEDURE — 99239 HOSP IP/OBS DSCHRG MGMT >30: CPT

## 2018-11-07 PROCEDURE — 36415 COLL VENOUS BLD VENIPUNCTURE: CPT

## 2018-11-07 PROCEDURE — 96361 HYDRATE IV INFUSION ADD-ON: CPT

## 2018-11-07 PROCEDURE — 36430 TRANSFUSION BLD/BLD COMPNT: CPT

## 2018-11-07 PROCEDURE — 84100 ASSAY OF PHOSPHORUS: CPT

## 2018-11-07 PROCEDURE — P9047: CPT

## 2018-11-07 PROCEDURE — 90686 IIV4 VACC NO PRSV 0.5 ML IM: CPT

## 2018-11-07 PROCEDURE — 93306 TTE W/DOPPLER COMPLETE: CPT

## 2018-11-07 PROCEDURE — 74176 CT ABD & PELVIS W/O CONTRAST: CPT

## 2018-11-07 PROCEDURE — 82945 GLUCOSE OTHER FLUID: CPT

## 2018-11-07 PROCEDURE — 80053 COMPREHEN METABOLIC PANEL: CPT

## 2018-11-07 PROCEDURE — 85730 THROMBOPLASTIN TIME PARTIAL: CPT

## 2018-11-07 PROCEDURE — 86901 BLOOD TYPING SEROLOGIC RH(D): CPT

## 2018-11-07 PROCEDURE — 87205 SMEAR GRAM STAIN: CPT

## 2018-11-07 PROCEDURE — 83986 ASSAY PH BODY FLUID NOS: CPT

## 2018-11-07 PROCEDURE — 85027 COMPLETE CBC AUTOMATED: CPT

## 2018-11-07 PROCEDURE — 86900 BLOOD TYPING SEROLOGIC ABO: CPT

## 2018-11-07 PROCEDURE — 74018 RADEX ABDOMEN 1 VIEW: CPT

## 2018-11-07 PROCEDURE — 96375 TX/PRO/DX INJ NEW DRUG ADDON: CPT

## 2018-11-07 PROCEDURE — 86850 RBC ANTIBODY SCREEN: CPT

## 2018-11-07 PROCEDURE — 85610 PROTHROMBIN TIME: CPT

## 2018-11-07 PROCEDURE — 93005 ELECTROCARDIOGRAM TRACING: CPT

## 2018-11-07 PROCEDURE — 74019 RADEX ABDOMEN 2 VIEWS: CPT

## 2018-11-07 PROCEDURE — 83735 ASSAY OF MAGNESIUM: CPT

## 2018-11-07 PROCEDURE — 87070 CULTURE OTHR SPECIMN AEROBIC: CPT

## 2018-11-07 PROCEDURE — 83615 LACTATE (LD) (LDH) ENZYME: CPT

## 2018-11-07 PROCEDURE — 71046 X-RAY EXAM CHEST 2 VIEWS: CPT

## 2018-11-07 PROCEDURE — 84157 ASSAY OF PROTEIN OTHER: CPT

## 2018-11-07 PROCEDURE — 89051 BODY FLUID CELL COUNT: CPT

## 2018-11-07 PROCEDURE — P9016: CPT

## 2018-11-07 RX ORDER — DOCUSATE SODIUM 100 MG
1 CAPSULE ORAL
Qty: 14 | Refills: 0 | OUTPATIENT
Start: 2018-11-07 | End: 2018-11-13

## 2018-11-07 RX ORDER — SENNA PLUS 8.6 MG/1
2 TABLET ORAL
Qty: 20 | Refills: 0 | OUTPATIENT
Start: 2018-11-07 | End: 2018-11-16

## 2018-11-07 RX ORDER — MORPHINE SULFATE 50 MG/1
1.25 CAPSULE, EXTENDED RELEASE ORAL
Qty: 37.5 | Refills: 0 | OUTPATIENT
Start: 2018-11-07 | End: 2018-11-11

## 2018-11-07 RX ORDER — MORPHINE SULFATE 50 MG/1
0.5 CAPSULE, EXTENDED RELEASE ORAL
Qty: 15 | Refills: 0 | OUTPATIENT
Start: 2018-11-07 | End: 2018-11-11

## 2018-11-07 RX ADMIN — INFLUENZA VIRUS VACCINE 0.5 MILLILITER(S): 15; 15; 15; 15 SUSPENSION INTRAMUSCULAR at 14:19

## 2018-11-07 RX ADMIN — Medication 100 MILLIGRAM(S): at 05:29

## 2018-11-07 RX ADMIN — Medication 25 MILLIGRAM(S): at 05:29

## 2018-11-07 NOTE — PROGRESS NOTE ADULT - SUBJECTIVE AND OBJECTIVE BOX
Date/Time Patient Seen:  		  Referring MD:   Data Reviewed	       Patient is a 78y old  Female who presents with a chief complaint of abdominal pain (06 Nov 2018 18:42)  vs and meds reviewed      Subjective/HPI     PAST MEDICAL & SURGICAL HISTORY:  Anemia  Atrial fibrillation: daughter reports no hx of afib, just palpitations.  Stomach cancer  Traumatic brain injury with loss of consciousness, sequela  History of loop recorder: implanted in 4/2017  H/O: hysterectomy: 2015  No significant past surgical history        Medication list         MEDICATIONS  (STANDING):  docusate sodium 100 milliGRAM(s) Oral two times a day  influenza   Vaccine 0.5 milliLiter(s) IntraMuscular once  metoprolol succinate ER 25 milliGRAM(s) Oral daily  mirtazapine 15 milliGRAM(s) Oral at bedtime  senna 2 Tablet(s) Oral at bedtime    MEDICATIONS  (PRN):  bisacodyl Suppository 10 milliGRAM(s) Rectal daily PRN Constipation  magnesium citrate Solution 240 milliLiter(s) Oral once PRN no bm  morphine  - Injectable 2 milliGRAM(s) IV Push every 4 hours PRN Moderate Pain (4 - 6)         Vitals log        ICU Vital Signs Last 24 Hrs  T(C): 36.7 (07 Nov 2018 09:37), Max: 37.2 (06 Nov 2018 17:00)  T(F): 98 (07 Nov 2018 09:37), Max: 99 (06 Nov 2018 17:00)  HR: 94 (07 Nov 2018 09:37) (94 - 116)  BP: 94/60 (07 Nov 2018 09:37) (94/47 - 100/58)  BP(mean): --  ABP: --  ABP(mean): --  RR: 16 (07 Nov 2018 09:37) (16 - 18)  SpO2: 98% (07 Nov 2018 09:37) (94% - 98%)           Input and Output:  I&O's Detail    06 Nov 2018 07:01  -  07 Nov 2018 07:00  --------------------------------------------------------  IN:    Oral Fluid: 300 mL  Total IN: 300 mL    OUT:  Total OUT: 0 mL    Total NET: 300 mL          Lab Data                        8.8    6.98  )-----------( 287      ( 06 Nov 2018 06:48 )             28.6     11-06    138  |  104  |  8   ----------------------------<  109<H>  4.3   |  32<H>  |  0.44<L>    Ca    7.8<L>      06 Nov 2018 06:48    TPro  4.1<L>  /  Alb  1.6<L>  /  TBili  0.2  /  DBili  x   /  AST  28  /  ALT  15  /  AlkPhos  74  11-06            Review of Systems	      Objective     Physical Examination  heart s1s2  lung dec BS        Pertinent Lab findings & Imaging      Allison:  NO   Adequate UO     I&O's Detail    06 Nov 2018 07:01  -  07 Nov 2018 07:00  --------------------------------------------------------  IN:    Oral Fluid: 300 mL  Total IN: 300 mL    OUT:  Total OUT: 0 mL    Total NET: 300 mL               Discussed with:     Cultures:	        Radiology
All interim records and events noted.    comfortable  no pain, SOB      MEDICATIONS  (STANDING):  digoxin     Tablet 0.125 milliGRAM(s) Oral daily  docusate sodium 100 milliGRAM(s) Oral two times a day  influenza   Vaccine 0.5 milliLiter(s) IntraMuscular once  metoprolol succinate ER 25 milliGRAM(s) Oral daily  mirtazapine 15 milliGRAM(s) Oral at bedtime  senna 2 Tablet(s) Oral at bedtime    MEDICATIONS  (PRN):  bisacodyl Suppository 10 milliGRAM(s) Rectal daily PRN Constipation  magnesium citrate Solution 240 milliLiter(s) Oral once PRN no bm  morphine  - Injectable 2 milliGRAM(s) IV Push every 4 hours PRN Moderate Pain (4 - 6)      Vital Signs Last 24 Hrs  T(C): 36.7 (04 Nov 2018 09:22), Max: 37 (04 Nov 2018 05:00)  T(F): 98.1 (04 Nov 2018 09:22), Max: 98.6 (04 Nov 2018 05:00)  HR: 91 (04 Nov 2018 09:22) (91 - 101)  BP: 89/45 (04 Nov 2018 09:22) (84/41 - 100/56)  BP(mean): --  RR: 16 (04 Nov 2018 09:22) (16 - 20)  SpO2: 99% (04 Nov 2018 09:22) (94% - 99%)    PHYSICAL EXAM  General: well developed  but thin, in no acute distress  Head: atraumatic, normocephalic  ENT: sclera anicteric, buccal mucosa moist  Neck: supple, trachea midline, no palpable masses  CV: S1 S2, regular rate and rhythm  Lungs: clear to auscultation, no wheezes/rhonchi  Abdomen: soft, nontender, bowel sounds present, not tense  Extrem: no clubbing/cyanosis/edema  Skin: no significant increased ecchymosis/petechiae  Neuro: alert and oriented,  no focal deficits      LABS:             8.9    6.35  )-----------( 304      ( 11-04 @ 07:06 )             29.1                7.0    6.93  )-----------( 344      ( 11-03 @ 16:22 )             23.7                7.2    6.25  )-----------( 316      ( 11-03 @ 06:33 )             24.2                8.8    8.10  )-----------( 395      ( 11-02 @ 14:01 )             28.9       11-04    141  |  105  |  9   ----------------------------<  92  4.1   |  31  |  0.56    Ca    7.9<L>      04 Nov 2018 07:07  Phos  3.5     11-03  Mg     2.1     11-03    TPro  4.3<L>  /  Alb  1.7<L>  /  TBili  0.5  /  DBili  x   /  AST  16  /  ALT  10  /  AlkPhos  71  11-04 11-03 @ 06:33  PT12.8 INR1.17  PTT31.5  11-02 @ 14:01  PT12.3 INR1.12  PTT27.5      RADIOLOGY & ADDITIONAL STUDIES:    IMPRESSION/RECOMMENDATIONS:
Date/Time Patient Seen:  		  Referring MD:   Data Reviewed	       Patient is a 78y old  Female who presents with a chief complaint of abdominal pain (03 Nov 2018 19:31)  in bed  seen and examined  vs and meds reviewed      Subjective/HPI     PAST MEDICAL & SURGICAL HISTORY:  Anemia  Atrial fibrillation: daughter reports no hx of afib, just palpitations.  Stomach cancer  Traumatic brain injury with loss of consciousness, sequela  History of loop recorder: implanted in 4/2017  H/O: hysterectomy: 2015  No significant past surgical history        Medication list         MEDICATIONS  (STANDING):  digoxin     Tablet 0.125 milliGRAM(s) Oral daily  docusate sodium 100 milliGRAM(s) Oral two times a day  influenza   Vaccine 0.5 milliLiter(s) IntraMuscular once  metoprolol succinate ER 25 milliGRAM(s) Oral daily  mirtazapine 15 milliGRAM(s) Oral at bedtime  senna 2 Tablet(s) Oral at bedtime    MEDICATIONS  (PRN):  bisacodyl Suppository 10 milliGRAM(s) Rectal daily PRN Constipation  magnesium citrate Solution 240 milliLiter(s) Oral once PRN no bm  morphine  - Injectable 2 milliGRAM(s) IV Push every 4 hours PRN Moderate Pain (4 - 6)         Vitals log        ICU Vital Signs Last 24 Hrs  T(C): 37 (04 Nov 2018 05:00), Max: 37 (04 Nov 2018 05:00)  T(F): 98.6 (04 Nov 2018 05:00), Max: 98.6 (04 Nov 2018 05:00)  HR: 100 (04 Nov 2018 05:00) (97 - 106)  BP: 93/51 (04 Nov 2018 05:00) (84/41 - 100/56)  BP(mean): --  ABP: --  ABP(mean): --  RR: 18 (04 Nov 2018 05:00) (18 - 20)  SpO2: 95% (04 Nov 2018 05:00) (94% - 98%)           Input and Output:  I&O's Detail      Lab Data                        8.9    6.35  )-----------( 304      ( 04 Nov 2018 07:06 )             29.1     11-04    141  |  105  |  9   ----------------------------<  92  4.1   |  31  |  0.56    Ca    7.9<L>      04 Nov 2018 07:07  Phos  3.5     11-03  Mg     2.1     11-03    TPro  4.3<L>  /  Alb  1.7<L>  /  TBili  0.5  /  DBili  x   /  AST  16  /  ALT  10  /  AlkPhos  71  11-04            Review of Systems	      Objective     Physical Examination    heart s1s2  lung dec BS      Pertinent Lab findings & Imaging      Cooper:  NO   Adequate UO     I&O's Detail           Discussed with:     Cultures:	        Radiology
Date/Time Patient Seen:  		  Referring MD:   Data Reviewed	       Patient is a 78y old  Female who presents with a chief complaint of abdominal pain (04 Nov 2018 15:58)  hospice eval under way      Subjective/HPI     PAST MEDICAL & SURGICAL HISTORY:  Anemia  Atrial fibrillation: daughter reports no hx of afib, just palpitations.  Stomach cancer  Traumatic brain injury with loss of consciousness, sequela  History of loop recorder: implanted in 4/2017  H/O: hysterectomy: 2015  No significant past surgical history        Medication list         MEDICATIONS  (STANDING):  docusate sodium 100 milliGRAM(s) Oral two times a day  influenza   Vaccine 0.5 milliLiter(s) IntraMuscular once  metoprolol succinate ER 25 milliGRAM(s) Oral daily  mirtazapine 15 milliGRAM(s) Oral at bedtime  senna 2 Tablet(s) Oral at bedtime    MEDICATIONS  (PRN):  bisacodyl Suppository 10 milliGRAM(s) Rectal daily PRN Constipation  magnesium citrate Solution 240 milliLiter(s) Oral once PRN no bm  morphine  - Injectable 2 milliGRAM(s) IV Push every 4 hours PRN Moderate Pain (4 - 6)         Vitals log        ICU Vital Signs Last 24 Hrs  T(C): 36.8 (05 Nov 2018 05:30), Max: 37.4 (04 Nov 2018 17:03)  T(F): 98.2 (05 Nov 2018 05:30), Max: 99.4 (04 Nov 2018 17:03)  HR: 101 (05 Nov 2018 05:30) (91 - 105)  BP: 104/60 (05 Nov 2018 05:30) (89/45 - 104/63)  BP(mean): 75 (05 Nov 2018 05:30) (75 - 75)  ABP: --  ABP(mean): --  RR: 17 (05 Nov 2018 05:30) (16 - 18)  SpO2: 95% (05 Nov 2018 05:30) (95% - 99%)           Input and Output:  I&O's Detail      Lab Data                        8.7    6.53  )-----------( 357      ( 05 Nov 2018 06:49 )             28.0     11-05    139  |  104  |  9   ----------------------------<  110<H>  4.2   |  30  |  0.53    Ca    7.9<L>      05 Nov 2018 06:49    TPro  4.4<L>  /  Alb  1.6<L>  /  TBili  0.3  /  DBili  x   /  AST  15  /  ALT  11  /  AlkPhos  69  11-05            Review of Systems	      Objective     Physical Examination    heart s1s2  lung dec BS  abd soft  cn grossly int      Pertinent Lab findings & Imaging      Cooper:  NO   Adequate UO     I&O's Detail           Discussed with:     Cultures:	        Radiology
Date/Time Patient Seen:  		  Referring MD:   Data Reviewed	       Patient is a 78y old  Female who presents with a chief complaint of abdominal pain (05 Nov 2018 19:08)  planned for [facility]      Subjective/HPI     PAST MEDICAL & SURGICAL HISTORY:  Anemia  Atrial fibrillation: daughter reports no hx of afib, just palpitations.  Stomach cancer  Traumatic brain injury with loss of consciousness, sequela  History of loop recorder: implanted in 4/2017  H/O: hysterectomy: 2015  No significant past surgical history        Medication list         MEDICATIONS  (STANDING):  docusate sodium 100 milliGRAM(s) Oral two times a day  influenza   Vaccine 0.5 milliLiter(s) IntraMuscular once  metoprolol succinate ER 25 milliGRAM(s) Oral daily  mirtazapine 15 milliGRAM(s) Oral at bedtime  senna 2 Tablet(s) Oral at bedtime    MEDICATIONS  (PRN):  bisacodyl Suppository 10 milliGRAM(s) Rectal daily PRN Constipation  magnesium citrate Solution 240 milliLiter(s) Oral once PRN no bm  morphine  - Injectable 2 milliGRAM(s) IV Push every 4 hours PRN Moderate Pain (4 - 6)         Vitals log        ICU Vital Signs Last 24 Hrs  T(C): 36.7 (06 Nov 2018 04:57), Max: 37.6 (05 Nov 2018 21:30)  T(F): 98 (06 Nov 2018 04:57), Max: 99.6 (05 Nov 2018 21:30)  HR: 98 (06 Nov 2018 04:57) (80 - 114)  BP: 97/60 (06 Nov 2018 04:57) (97/60 - 110/69)  BP(mean): --  ABP: --  ABP(mean): --  RR: 18 (06 Nov 2018 04:57) (16 - 18)  SpO2: 98% (06 Nov 2018 04:57) (94% - 98%)           Input and Output:  I&O's Detail    05 Nov 2018 07:01  -  06 Nov 2018 07:00  --------------------------------------------------------  IN:    Oral Fluid: 890 mL  Total IN: 890 mL    OUT:  Total OUT: 0 mL    Total NET: 890 mL          Lab Data                        8.8    6.98  )-----------( 287      ( 06 Nov 2018 06:48 )             28.6     11-05    139  |  104  |  9   ----------------------------<  110<H>  4.2   |  30  |  0.53    Ca    7.9<L>      05 Nov 2018 06:49    TPro  4.4<L>  /  Alb  1.6<L>  /  TBili  0.3  /  DBili  x   /  AST  15  /  ALT  11  /  AlkPhos  69  11-05            Review of Systems	      Objective     Physical Examination    heart s1s2  lung dec BS    Pertinent Lab findings & Imaging      Allison:  NO   Adequate UO     I&O's Detail    05 Nov 2018 07:01  -  06 Nov 2018 07:00  --------------------------------------------------------  IN:    Oral Fluid: 890 mL  Total IN: 890 mL    OUT:  Total OUT: 0 mL    Total NET: 890 mL               Discussed with:     Cultures:	        Radiology
INTERVAL HPI/OVERNIGHT EVENTS:  No new overnight event.  No N/V/D.  Tolerating diet.  no melena  Allergies    No Known Allergies    Intolerances  General:  No wt loss, fevers, chills, night sweats, fatigue,   Eyes:  Good vision, no reported pain  ENT:  No sore throat, pain, runny nose, dysphagia  CV:  No pain, palpitations, hypo/hypertension  Resp:  No dyspnea, cough, tachypnea, wheezing  GI:  No pain, No nausea, No vomiting, No diarrhea, No constipation, No weight loss, No fever, No pruritis, No rectal bleeding, No tarry stools, No dysphagia,  :  No pain, bleeding, incontinence, nocturia  Muscle:  No pain, weakness  Neuro:  No weakness, tingling, memory problems  Psych:  No fatigue, insomnia, mood problems, depression  Endocrine:  No polyuria, polydipsia, cold/heat intolerance  Heme:  No petechiae, ecchymosis, easy bruisability  Skin:  No rash, tattoos, scars, edema      PHYSICAL EXAM:   Vital Signs:  Vital Signs Last 24 Hrs  T(C): 36.8 (05 Nov 2018 17:32), Max: 37.2 (04 Nov 2018 20:48)  T(F): 98.3 (05 Nov 2018 17:32), Max: 98.9 (04 Nov 2018 20:48)  HR: 112 (05 Nov 2018 17:32) (80 - 112)  BP: 104/63 (05 Nov 2018 17:32) (92/52 - 104/63)  BP(mean): 75 (05 Nov 2018 05:30) (75 - 75)  RR: 18 (05 Nov 2018 17:32) (16 - 18)  SpO2: 95% (05 Nov 2018 17:32) (95% - 97%)  Daily     Daily I&O's Summary    05 Nov 2018 07:01  -  05 Nov 2018 19:08  --------------------------------------------------------  IN: 650 mL / OUT: 0 mL / NET: 650 mL        GENERAL:  Appears stated age, well-groomed, well-nourished, no distress  HEENT:  NC/AT,  conjunctivae clear and pink, no thyromegaly, nodules, adenopathy, no JVD, sclera -anicteric  CHEST:  Full & symmetric excursion, no increased effort, breath sounds clear  HEART:  Regular rhythm, S1, S2, no murmur/rub/S3/S4, no abdominal bruit, no edema  ABDOMEN:  Soft, non-tender, non-distended, normoactive bowel sounds,  no masses ,no hepato-splenomegaly, no signs of chronic liver disease  EXTEREMITIES:  no cyanosis,clubbing or edema  SKIN:  No rash/erythema/ecchymoses/petechiae/wounds/abscess/warm/dry  NEURO:  Alert, oriented, no asterixis, no tremor, no encephalopathy      LABS:                        8.7    6.53  )-----------( 357      ( 05 Nov 2018 06:49 )             28.0     11-05    139  |  104  |  9   ----------------------------<  110<H>  4.2   |  30  |  0.53    Ca    7.9<L>      05 Nov 2018 06:49    TPro  4.4<L>  /  Alb  1.6<L>  /  TBili  0.3  /  DBili  x   /  AST  15  /  ALT  11  /  AlkPhos  69  11-05        amylase   lipase  RADIOLOGY & ADDITIONAL TESTS:
Patient is a 78y old  Female who presents with a chief complaint of abdominal pain (04 Nov 2018 09:40)      INTERVAL History of Present Illness/OVERNIGHT EVENTS: feels better with PRBC    MEDICATIONS  (STANDING):  digoxin     Tablet 0.125 milliGRAM(s) Oral daily  docusate sodium 100 milliGRAM(s) Oral two times a day  influenza   Vaccine 0.5 milliLiter(s) IntraMuscular once  metoprolol succinate ER 25 milliGRAM(s) Oral daily  mirtazapine 15 milliGRAM(s) Oral at bedtime  senna 2 Tablet(s) Oral at bedtime    MEDICATIONS  (PRN):  bisacodyl Suppository 10 milliGRAM(s) Rectal daily PRN Constipation  magnesium citrate Solution 240 milliLiter(s) Oral once PRN no bm  morphine  - Injectable 2 milliGRAM(s) IV Push every 4 hours PRN Moderate Pain (4 - 6)      Allergies    No Known Allergies    Intolerances        REVIEW OF SYSTEMS:  Negative unless otherwise specified above.    Vital Signs Last 24 Hrs  T(C): 36.7 (04 Nov 2018 09:22), Max: 37 (04 Nov 2018 05:00)  T(F): 98.1 (04 Nov 2018 09:22), Max: 98.6 (04 Nov 2018 05:00)  HR: 91 (04 Nov 2018 09:22) (91 - 101)  BP: 89/45 (04 Nov 2018 09:22) (84/41 - 100/56)  BP(mean): --  RR: 16 (04 Nov 2018 09:22) (16 - 20)  SpO2: 99% (04 Nov 2018 09:22) (94% - 99%)        PHYSICAL EXAM:  GENERAL: Cachectic  HEAD:  Atraumatic, Normocephalic  EYES: conjunctiva and sclera clear  ENMT: Moist mucous membranes  NECK: Supple  CHEST/LUNG: Clear to auscultation bilaterally  HEART: Regular rate and rhythm  ABDOMEN: Soft, Nontender, Nondistended  EXTREMITIES:  Muscle wasting  SKIN: No rashes or lesions  NERVOUS SYSTEM:  Bilateral Lower extremity mobile, sensation to light touch intact      LABS:                        8.9    6.35  )-----------( 304      ( 04 Nov 2018 07:06 )             29.1     04 Nov 2018 07:07    141    |  105    |  9      ----------------------------<  92     4.1     |  31     |  0.56     Ca    7.9        04 Nov 2018 07:07    TPro  4.3    /  Alb  1.7    /  TBili  0.5    /  DBili  x      /  AST  16     /  ALT  10     /  AlkPhos  71     04 Nov 2018 07:07    PT/INR - ( 03 Nov 2018 06:33 )   PT: 12.8 sec;   INR: 1.17 ratio         PTT - ( 03 Nov 2018 06:33 )  PTT:31.5 sec    CAPILLARY BLOOD GLUCOSE          RADIOLOGY & ADDITIONAL TESTS:    Images reviewed personally    Consultant Notes Reviewed and Care Discussed with relevant Consultants/Other Providers.
Patient is a 78y old  Female who presents with a chief complaint of abdominal pain; metastatic cancer (05 Nov 2018 12:59).    INTERVAL History of Present Illness/OVERNIGHT EVENTS: stable    MEDICATIONS  (STANDING):  docusate sodium 100 milliGRAM(s) Oral two times a day  influenza   Vaccine 0.5 milliLiter(s) IntraMuscular once  metoprolol succinate ER 25 milliGRAM(s) Oral daily  mirtazapine 15 milliGRAM(s) Oral at bedtime  senna 2 Tablet(s) Oral at bedtime    MEDICATIONS  (PRN):  bisacodyl Suppository 10 milliGRAM(s) Rectal daily PRN Constipation  magnesium citrate Solution 240 milliLiter(s) Oral once PRN no bm  morphine  - Injectable 2 milliGRAM(s) IV Push every 4 hours PRN Moderate Pain (4 - 6)      Allergies    No Known Allergies    Intolerances        REVIEW OF SYSTEMS:  Negative unless otherwise specified above.    Vital Signs Last 24 Hrs  T(C): 36.8 (05 Nov 2018 09:06), Max: 37.4 (04 Nov 2018 17:03)  T(F): 98.2 (05 Nov 2018 09:06), Max: 99.4 (04 Nov 2018 17:03)  HR: 96 (05 Nov 2018 09:06) (94 - 105)  BP: 98/58 (05 Nov 2018 09:06) (92/52 - 104/63)  BP(mean): 75 (05 Nov 2018 05:30) (75 - 75)  RR: 16 (05 Nov 2018 09:06) (16 - 18)  SpO2: 97% (05 Nov 2018 09:06) (95% - 97%)        PHYSICAL EXAM:  GENERAL: Cachectic  HEAD:  Atraumatic, Normocephalic  EYES: Conjunctiva and sclera clear  ENMT: Moist mucous membranes  NECK: Supple  CHEST/LUNG: Clear to auscultation bilaterally  HEART: Regular rate and rhythm  ABDOMEN: Soft, Nontender, Nondistended  EXTREMITIES:  Muscle wasting  SKIN: No rashes or lesions  NERVOUS SYSTEM:  Bilateral Lower extremity mobile, sensation to light touch intact      LABS:                        8.7    6.53  )-----------( 357      ( 05 Nov 2018 06:49 )             28.0     05 Nov 2018 06:49    139    |  104    |  9      ----------------------------<  110    4.2     |  30     |  0.53     Ca    7.9        05 Nov 2018 06:49    TPro  4.4    /  Alb  1.6    /  TBili  0.3    /  DBili  x      /  AST  15     /  ALT  11     /  AlkPhos  69     05 Nov 2018 06:49        CAPILLARY BLOOD GLUCOSE          RADIOLOGY & ADDITIONAL TESTS:    Images reviewed personally    Consultant Notes Reviewed and Care Discussed with relevant Consultants/Other Providers.
Patient is a 78y old  Female who presents with a chief complaint of sob (02 Nov 2018 20:45)      INTERVAL History of Present Illness/OVERNIGHT EVENTS: patient feels a lot better after 6L paracentesis.  no active bleeding.  hb lower from ? dilution    MEDICATIONS  (STANDING):  digoxin     Tablet 0.125 milliGRAM(s) Oral daily  influenza   Vaccine 0.5 milliLiter(s) IntraMuscular once  metoprolol succinate ER 25 milliGRAM(s) Oral daily  mirtazapine 15 milliGRAM(s) Oral at bedtime    MEDICATIONS  (PRN):  morphine  - Injectable 2 milliGRAM(s) IV Push every 4 hours PRN Moderate Pain (4 - 6)      Allergies    No Known Allergies    Intolerances        REVIEW OF SYSTEMS:  Negative unless otherwise specified above.    Vital Signs Last 24 Hrs  T(C): 36.9 (03 Nov 2018 09:18), Max: 36.9 (03 Nov 2018 05:26)  T(F): 98.5 (03 Nov 2018 09:18), Max: 98.5 (03 Nov 2018 09:18)  HR: 106 (03 Nov 2018 09:18) (92 - 106)  BP: 91/46 (03 Nov 2018 09:18) (91/46 - 108/58)  BP(mean): --  RR: 20 (03 Nov 2018 09:18) (16 - 20)  SpO2: 96% (03 Nov 2018 09:18) (95% - 100%)    Height (cm): 162.56 (11-02 @ 13:39)  Weight (kg): 49.9 (11-02 @ 13:39)  BMI (kg/m2): 18.9 (11-02 @ 13:39)  BSA (m2): 1.52 (11-02 @ 13:39)    PHYSICAL EXAM:  GENERAL: Cachectic  HEAD:  Atraumatic, Normocephalic  EYES: conjunctiva and sclera clear  ENMT: Moist mucous membranes  NECK: Supple  CHEST/LUNG: Clear to auscultation bilaterally  HEART: Irregular rate and rhythm  ABDOMEN: Soft, Nontender, Nondistended  EXTREMITIES:  Muscle wasting  SKIN: No rashes or lesions  NERVOUS SYSTEM:  Bilateral Lower extremity mobile, sensation to light touch intact      LABS:                        7.2    6.25  )-----------( 316      ( 03 Nov 2018 06:33 )             24.2     03 Nov 2018 06:33    141    |  105    |  11     ----------------------------<  74     4.2     |  30     |  0.62     Ca    8.2        03 Nov 2018 06:33  Phos  3.5       03 Nov 2018 06:33  Mg     2.1       03 Nov 2018 06:33    TPro  4.7    /  Alb  1.9    /  TBili  0.3    /  DBili  x      /  AST  15     /  ALT  12     /  AlkPhos  71     03 Nov 2018 06:33    PT/INR - ( 03 Nov 2018 06:33 )   PT: 12.8 sec;   INR: 1.17 ratio         PTT - ( 03 Nov 2018 06:33 )  PTT:31.5 sec    CAPILLARY BLOOD GLUCOSE          RADIOLOGY & ADDITIONAL TESTS:    Images reviewed personally    Consultant Notes Reviewed and Care Discussed with relevant Consultants/Other Providers.
INTERVAL HPI/OVERNIGHT EVENTS:  No new overnight event.  No N/V/D.  Tolerating diet.  less pain    Allergies    No Known Allergies    Intolerances    General:  No wt loss, fevers, chills, night sweats, fatigue,   Eyes:  Good vision, no reported pain  ENT:  No sore throat, pain, runny nose, dysphagia  CV:  No pain, palpitations, hypo/hypertension  Resp:  No dyspnea, cough, tachypnea, wheezing  GI:  No pain, No nausea, No vomiting, No diarrhea, No constipation, No weight loss, No fever, No pruritis, No rectal bleeding, No tarry stools, No dysphagia,  :  No pain, bleeding, incontinence, nocturia  Muscle:  No pain, weakness  Neuro:  No weakness, tingling, memory problems  Psych:  No fatigue, insomnia, mood problems, depression  Endocrine:  No polyuria, polydipsia, cold/heat intolerance  Heme:  No petechiae, ecchymosis, easy bruisability  Skin:  No rash, tattoos, scars, edema      PHYSICAL EXAM:   Vital Signs:  Vital Signs Last 24 Hrs  T(C): 36.9 (06 Nov 2018 14:19), Max: 37.6 (05 Nov 2018 21:30)  T(F): 98.4 (06 Nov 2018 14:19), Max: 99.6 (05 Nov 2018 21:30)  HR: 110 (06 Nov 2018 14:19) (98 - 114)  BP: 99/59 (06 Nov 2018 14:19) (96/48 - 110/69)  BP(mean): --  RR: 16 (06 Nov 2018 14:19) (16 - 18)  SpO2: 97% (06 Nov 2018 14:19) (94% - 98%)  Daily     Daily I&O's Summary    05 Nov 2018 07:01  -  06 Nov 2018 07:00  --------------------------------------------------------  IN: 890 mL / OUT: 0 mL / NET: 890 mL    06 Nov 2018 07:01  -  06 Nov 2018 14:25  --------------------------------------------------------  IN: 300 mL / OUT: 0 mL / NET: 300 mL        GENERAL:  Appears stated age, well-groomed, well-nourished, no distress  HEENT:  NC/AT,  conjunctivae clear and pink, no thyromegaly, nodules, adenopathy, no JVD, sclera -anicteric  CHEST:  Full & symmetric excursion, no increased effort, breath sounds clear  HEART:  Regular rhythm, S1, S2, no murmur/rub/S3/S4, no abdominal bruit, no edema  ABDOMEN:  Soft, non-tender, non-distended, normoactive bowel sounds,  no masses ,no hepato-splenomegaly, no signs of chronic liver disease  EXTEREMITIES:  no cyanosis,clubbing or edema  SKIN:  No rash/erythema/ecchymoses/petechiae/wounds/abscess/warm/dry  NEURO:  Alert, oriented, no asterixis, no tremor, no encephalopathy      LABS:                        8.8    6.98  )-----------( 287      ( 06 Nov 2018 06:48 )             28.6     11-06    138  |  104  |  8   ----------------------------<  109<H>  4.3   |  32<H>  |  0.44<L>    Ca    7.8<L>      06 Nov 2018 06:48    TPro  4.1<L>  /  Alb  1.6<L>  /  TBili  0.2  /  DBili  x   /  AST  28  /  ALT  15  /  AlkPhos  74  11-06        amylase   lipase  RADIOLOGY & ADDITIONAL TESTS:
Patient seen and examined;  Chart reviewed and events noted;   sitting in bed; eating lunch; nieces at bedside      MEDICATIONS  (STANDING):  docusate sodium 100 milliGRAM(s) Oral two times a day  metoprolol succinate ER 25 milliGRAM(s) Oral daily  mirtazapine 15 milliGRAM(s) Oral at bedtime  senna 2 Tablet(s) Oral at bedtime    MEDICATIONS  (PRN):  bisacodyl Suppository 10 milliGRAM(s) Rectal daily PRN Constipation  magnesium citrate Solution 240 milliLiter(s) Oral once PRN no bm  morphine  - Injectable 2 milliGRAM(s) IV Push every 4 hours PRN Moderate Pain (4 - 6)      Vital Signs Last 24 Hrs  T(C): 36.8 (05 Nov 2018 09:06), Max: 37.4 (04 Nov 2018 17:03)  T(F): 98.2 (05 Nov 2018 09:06), Max: 99.4 (04 Nov 2018 17:03)  HR: 96 (05 Nov 2018 09:06) (94 - 105)  BP: 98/58 (05 Nov 2018 09:06) (92/52 - 104/63)  BP(mean): 75 (05 Nov 2018 05:30) (75 - 75)  RR: 16 (05 Nov 2018 09:06) (16 - 18)  SpO2: 97% (05 Nov 2018 09:06) (95% - 97%)    PHYSICAL EXAM  General: thin adult in NAD  HEENT: clear oropharynx, anicteric sclera, pink conjunctivae; + temporal wasting  Neck: supple  CV: normal S1S2 with no murmur rubs or gallops  Lungs: clear to auscultation, no wheezes, no rhales  Abdomen: soft non-tender non-distended, no hepato/splenomegaly  Ext: no clubbing cyanosis or edema  Skin: no rashes and no petichiae  Neuro: alert and oriented X3 no focal deficits      LABS:                        8.7    6.53  )-----------( 357      ( 05 Nov 2018 06:49 )             28.0     Hemoglobin: 8.7 g/dL (11-05 @ 06:49)  Hemoglobin: 8.9 g/dL (11-04 @ 07:06)  Hemoglobin: 7.0 g/dL (11-03 @ 16:22)  Hemoglobin: 7.2 g/dL (11-03 @ 06:33)  Hemoglobin: 8.8 g/dL (11-02 @ 14:01)    11-05    139  |  104  |  9   ----------------------------<  110<H>  4.2   |  30  |  0.53    Ca    7.9<L>      05 Nov 2018 06:49    TPro  4.4<L>  /  Alb  1.6<L>  /  TBili  0.3  /  DBili  x   /  AST  15  /  ALT  11  /  AlkPhos  69  11-05
INTERVAL HPI/OVERNIGHT EVENTS:  HPI:  No new overnight event.  No N/V/D.  Tolerating diet.  feels good small bm    Allergies    No Known Allergies    Intolerances          General:  No wt loss, fevers, chills, night sweats, fatigue,   Eyes:  Good vision, no reported pain  ENT:  No sore throat, pain, runny nose, dysphagia  CV:  No pain, palpitations, hypo/hypertension  Resp:  No dyspnea, cough, tachypnea, wheezing  GI:  No pain, No nausea, No vomiting, No diarrhea, No constipation, No weight loss, No fever, No pruritis, No rectal bleeding, No tarry stools, No dysphagia,  :  No pain, bleeding, incontinence, nocturia  Muscle:  No pain, weakness  Neuro:  No weakness, tingling, memory problems  Psych:  No fatigue, insomnia, mood problems, depression  Endocrine:  No polyuria, polydipsia, cold/heat intolerance  Heme:  No petechiae, ecchymosis, easy bruisability  Skin:  No rash, tattoos, scars, edema      PHYSICAL EXAM:   Vital Signs:  Vital Signs Last 24 Hrs  T(C): 36.7 (04 Nov 2018 14:43), Max: 37 (04 Nov 2018 05:00)  T(F): 98 (04 Nov 2018 14:43), Max: 98.6 (04 Nov 2018 05:00)  HR: 97 (04 Nov 2018 14:43) (91 - 101)  BP: 94/54 (04 Nov 2018 14:43) (89/45 - 100/56)  BP(mean): --  RR: 16 (04 Nov 2018 14:43) (16 - 20)  SpO2: 97% (04 Nov 2018 14:43) (94% - 99%)  Daily     Daily I&O's Summary      GENERAL:  Appears stated age, well-groomed, well-nourished, no distress  HEENT:  NC/AT,  conjunctivae clear and pink, no thyromegaly, nodules, adenopathy, no JVD, sclera -anicteric  CHEST:  Full & symmetric excursion, no increased effort, breath sounds clear  HEART:  Regular rhythm, S1, S2, no murmur/rub/S3/S4, no abdominal bruit, no edema  ABDOMEN:  Soft, non-tender, non-distended, normoactive bowel sounds,  no masses ,no hepato-splenomegaly, no signs of chronic liver disease  EXTEREMITIES:  no cyanosis,clubbing or edema  SKIN:  No rash/erythema/ecchymoses/petechiae/wounds/abscess/warm/dry  NEURO:  Alert, oriented, no asterixis, no tremor, no encephalopathy      LABS:                        8.9    6.35  )-----------( 304      ( 04 Nov 2018 07:06 )             29.1     11-04    141  |  105  |  9   ----------------------------<  92  4.1   |  31  |  0.56    Ca    7.9<L>      04 Nov 2018 07:07  Phos  3.5     11-03  Mg     2.1     11-03    TPro  4.3<L>  /  Alb  1.7<L>  /  TBili  0.5  /  DBili  x   /  AST  16  /  ALT  10  /  AlkPhos  71  11-04    PT/INR - ( 03 Nov 2018 06:33 )   PT: 12.8 sec;   INR: 1.17 ratio         PTT - ( 03 Nov 2018 06:33 )  PTT:31.5 sec    amylase   lipase  RADIOLOGY & ADDITIONAL TESTS:
Patient reports feeling better. No new symptoms.       Constitutional: No fever, fatigue or weight loss.  Skin: No rash.  Eyes: No recent vision problems or eye pain.  ENT: No congestion, ear pain, or sore throat.  Endocrine: No thyroid problems.  Cardiovascular: No chest pain or palpation.  Respiratory: No cough, shortness of breath, congestion, or wheezing.  Gastrointestinal: No abdominal pain, nausea, vomiting, or diarrhea.  Genitourinary: No dysuria.  Musculoskeletal: No joint swelling.  Neurologic: No headache.      Vital Signs Last 24 Hrs  T(C): 37.1 (06 Nov 2018 09:32), Max: 37.6 (05 Nov 2018 21:30)  T(F): 98.8 (06 Nov 2018 09:32), Max: 99.6 (05 Nov 2018 21:30)  HR: 101 (06 Nov 2018 09:32) (80 - 114)  BP: 96/48 (06 Nov 2018 09:32) (96/48 - 110/69)  BP(mean): --  RR: 16 (06 Nov 2018 09:32) (16 - 18)  SpO2: 96% (06 Nov 2018 09:32) (94% - 98%)      PHYSICAL EXAM-    GENERAL: NAD, well-groomed, well-developed  HEAD:  Atraumatic, Normocephalic  EYES: EOMI, PERRLA, conjunctiva and sclera clear  NECK: Supple, No JVD, Normal thyroid  NERVOUS SYSTEM:  Alert & Oriented;  motor and sensory exam - normal  CHEST/LUNG: Clear to percussion bilaterally; No rales, rhonchi, wheezing, or rubs  HEART: Regular rate and rhythm; No murmurs, rubs, or gallops  ABDOMEN: Soft, Nontender, mildly distended; Bowel sounds present  EXTREMITIES:  2+ Peripheral Pulses, No clubbing, cyanosis, or edema  SKIN: No rashes or lesions                              8.8    6.98  )-----------( 287      ( 06 Nov 2018 06:48 )             28.6       11-06    138  |  104  |  8   ----------------------------<  109<H>  4.3   |  32<H>  |  0.44<L>    Ca    7.8<L>      06 Nov 2018 06:48    TPro  4.1<L>  /  Alb  1.6<L>  /  TBili  0.2  /  DBili  x   /  AST  28  /  ALT  15  /  AlkPhos  74  11-06
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  Patient has been suffering off and on with abdominal discomfort for awhile but for the past week her abdomen has grown in size.  With the increase in size her discomfort has now progressed to pain.  Patient also now has started to experience nausea and bilious nonbloody vomiting.  Patient went to her NP yesterday and they were concerned for a blockage and had an xray.  Since the symptoms did not irma, patient was told to come here for further evaluation and treatment.  In the ED, patient was found to massive ascites on CT.  Also found to have anemia with hgb of 8.8. s/p tap yuesterday now with some pain and no bm also    MEDICATIONS  (STANDING):  digoxin     Tablet 0.125 milliGRAM(s) Oral daily  docusate sodium 100 milliGRAM(s) Oral two times a day  influenza   Vaccine 0.5 milliLiter(s) IntraMuscular once  metoprolol succinate ER 25 milliGRAM(s) Oral daily  mirtazapine 15 milliGRAM(s) Oral at bedtime  senna 2 Tablet(s) Oral at bedtime    MEDICATIONS  (PRN):  bisacodyl Suppository 10 milliGRAM(s) Rectal daily PRN Constipation  morphine  - Injectable 2 milliGRAM(s) IV Push every 4 hours PRN Moderate Pain (4 - 6)      Allergies    No Known Allergies    Intolerances    General:  No wt loss, fevers, chills, night sweats, fatigue,   Eyes:  Good vision, no reported pain  ENT:  No sore throat, pain, runny nose, dysphagia  CV:  No pain, palpitations, hypo/hypertension  Resp:  No dyspnea, cough, tachypnea, wheezing  GI:  No pain, No nausea, No vomiting, No diarrhea, No constipation, No weight loss, No fever, No pruritis, No rectal bleeding, No tarry stools, No dysphagia,  :  No pain, bleeding, incontinence, nocturia  Muscle:  No pain, weakness  Neuro:  No weakness, tingling, memory problems  Psych:  No fatigue, insomnia, mood problems, depression  Endocrine:  No polyuria, polydipsia, cold/heat intolerance  Heme:  No petechiae, ecchymosis, easy bruisability  Skin:  No rash, tattoos, scars, edema      PHYSICAL EXAM:   Vital Signs:  Vital Signs Last 24 Hrs  T(C): 36.3 (2018 14:39), Max: 36.9 (2018 05:26)  T(F): 97.4 (2018 14:39), Max: 98.5 (2018 09:18)  HR: 98 (2018 14:39) (93 - 106)  BP: 84/41 (2018 14:39) (84/41 - 108/56)  BP(mean): --  RR: 18 (2018 14:39) (17 - 20)  SpO2: 94% (2018 14:39) (94% - 100%)  Daily     Daily Weight in k.6 (2018 22:46)I&O's Summary    2018 07:01  -  2018 07:00  --------------------------------------------------------  IN: 0 mL / OUT: 400 mL / NET: -400 mL        GENERAL:  Appears stated age, well-groomed, well-nourished, no distress  HEENT:  NC/AT,  conjunctivae clear and pink, no thyromegaly, nodules, adenopathy, no JVD, sclera -anicteric  CHEST:  Full & symmetric excursion, no increased effort, breath sounds clear  HEART:  Regular rhythm, S1, S2, no murmur/rub/S3/S4, no abdominal bruit, no edema  ABDOMEN:  Soft, non-tender, non-distended, normoactive bowel sounds,  no masses ,no hepato-splenomegaly, no signs of chronic liver disease  EXTEREMITIES:  no cyanosis,clubbing or edema  SKIN:  No rash/erythema/ecchymoses/petechiae/wounds/abscess/warm/dry  NEURO:  Alert, oriented, no asterixis, no tremor, no encephalopathy      LABS:                        7.0    6.93  )-----------( 344      ( 2018 16:22 )             23.7     11-03    141  |  105  |  11  ----------------------------<  74  4.2   |  30  |  0.62    Ca    8.2<L>      2018 06:33  Phos  3.5       Mg     2.1         TPro  4.7<L>  /  Alb  1.9<L>  /  TBili  0.3  /  DBili  x   /  AST  15  /  ALT  12  /  AlkPhos  71      PT/INR - ( 2018 06:33 )   PT: 12.8 sec;   INR: 1.17 ratio         PTT - ( 2018 06:33 )  PTT:31.5 sec    amylase   lipase  RADIOLOGY & ADDITIONAL TESTS:

## 2018-11-07 NOTE — PROGRESS NOTE ADULT - PROBLEM SELECTOR PLAN 1
daily cbc   transfuse prn   consider hematology eval  check iron studies   ppi once a day  check stool occult blood  further recommendations pending above
mets ca  planned for poss Hospice home DC  may benefit from Roxanol at home PRN for pain, distress, suffering, and bowel regimen to prevent constipation, eg. Dulcolax
adv malignancy  not a candidate for any aggressive measures as per Heme onc  hospice eval under way  discussed with daughter, HCP  prognosis very poor  supportive medical regimen and care  will follow and monitor  assessment and monitoring for pain, distress, suffering
daily cbc   transfuse prn   consider hematology eval  consider bowel regimen  check iron studies   ppi once a day  check stool occult blood  further recommendations pending above
mets ca  ascites  end of life situation  not a candidate for further therapy as per Onc  planned for poss Home Hospice, does not have to be DNR for home hospice, however, pt's condition is appropriate for DNR DNI, discussed with dtr  may benefit from home regimen of Roxanol SL as needed and Bowel regimen  will follow  prognosis poor  CM notes reviewed
mets ca  follows with [facility]  discussed with pt and family  pt has hx of TBI, unable to decide on her own  eldest 2 children are HCP  pt is full code for now, however, HCP are willing to explore the idea of Home Hospice  cont supportive medical regimen and pain assessment, on Opioids PRN  will follow  counseling  education  support
s/p 6 liter paracentesis 11/2.  feels better.  f/up GI/onc recs
s/p 6 liter paracentesis 11/2.  feels better.  f/up GI/onc recs.  Unknown primary - not a chemo candidate
s/p 6 liter paracentesis 11/2.  feels better.  f/up GI/onc recs.  Unknown primary - not a chemo candidate.  Await Hospice eval.
daily cbc   transfuse prn   consider hematology eval  consider bowel regimen  check iron studies   ppi once a day  check stool occult blood  further recommendations pending above
daily cbc   transfuse prn   consider hematology eval  consider bowel regimen  check iron studies   ppi once a day  check stool occult blood  further recommendations pending above

## 2018-11-07 NOTE — PROGRESS NOTE ADULT - REASON FOR ADMISSION
abdominal pain
abdominal pain; metastatic cancer
abdominal pain

## 2018-11-07 NOTE — PROGRESS NOTE ADULT - PROBLEM SELECTOR PROBLEM 1
Malignant ascites
Iron deficiency anemia, unspecified iron deficiency anemia type
Malignant ascites
Other ascites
Iron deficiency anemia, unspecified iron deficiency anemia type

## 2018-11-08 LAB
CULTURE RESULTS: SIGNIFICANT CHANGE UP
SPECIMEN SOURCE: SIGNIFICANT CHANGE UP

## 2018-11-14 PROBLEM — D64.9 ANEMIA, UNSPECIFIED: Chronic | Status: ACTIVE | Noted: 2018-11-02

## 2018-11-14 PROBLEM — I48.91 UNSPECIFIED ATRIAL FIBRILLATION: Chronic | Status: ACTIVE | Noted: 2018-11-02

## 2018-11-14 PROBLEM — C16.9 MALIGNANT NEOPLASM OF STOMACH, UNSPECIFIED: Chronic | Status: ACTIVE | Noted: 2018-11-02

## 2018-11-16 ENCOUNTER — OUTPATIENT (OUTPATIENT)
Dept: OUTPATIENT SERVICES | Facility: HOSPITAL | Age: 78
LOS: 1 days | End: 2018-11-16
Payer: MEDICARE

## 2018-11-16 DIAGNOSIS — C78.6 SECONDARY MALIGNANT NEOPLASM OF RETROPERITONEUM AND PERITONEUM: ICD-10-CM

## 2018-11-16 DIAGNOSIS — Z98.890 OTHER SPECIFIED POSTPROCEDURAL STATES: Chronic | ICD-10-CM

## 2018-11-16 DIAGNOSIS — Z90.710 ACQUIRED ABSENCE OF BOTH CERVIX AND UTERUS: Chronic | ICD-10-CM

## 2018-11-16 PROCEDURE — P9047: CPT

## 2018-11-16 PROCEDURE — 49083 ABD PARACENTESIS W/IMAGING: CPT

## 2018-11-16 RX ORDER — ALBUMIN HUMAN 25 %
50 VIAL (ML) INTRAVENOUS
Qty: 0 | Refills: 0 | Status: COMPLETED | OUTPATIENT
Start: 2018-11-16 | End: 2018-11-16

## 2018-11-16 RX ADMIN — Medication 100 MILLILITER(S): at 12:36

## 2018-11-16 RX ADMIN — Medication 100 MILLILITER(S): at 11:59

## 2018-11-28 ENCOUNTER — OUTPATIENT (OUTPATIENT)
Dept: OUTPATIENT SERVICES | Facility: HOSPITAL | Age: 78
LOS: 1 days | End: 2018-11-28
Payer: MEDICARE

## 2018-11-28 DIAGNOSIS — C78.6 SECONDARY MALIGNANT NEOPLASM OF RETROPERITONEUM AND PERITONEUM: ICD-10-CM

## 2018-11-28 DIAGNOSIS — Z90.710 ACQUIRED ABSENCE OF BOTH CERVIX AND UTERUS: Chronic | ICD-10-CM

## 2018-11-28 DIAGNOSIS — Z98.890 OTHER SPECIFIED POSTPROCEDURAL STATES: Chronic | ICD-10-CM

## 2018-11-28 PROCEDURE — 49083 ABD PARACENTESIS W/IMAGING: CPT

## 2018-11-28 PROCEDURE — P9047: CPT

## 2018-11-28 RX ORDER — ALBUMIN HUMAN 25 %
50 VIAL (ML) INTRAVENOUS
Qty: 0 | Refills: 0 | Status: COMPLETED | OUTPATIENT
Start: 2018-11-28 | End: 2018-11-28

## 2018-11-28 RX ADMIN — Medication 100 MILLILITER(S): at 10:11

## 2018-11-28 RX ADMIN — Medication 100 MILLILITER(S): at 09:32

## 2018-12-07 ENCOUNTER — OUTPATIENT (OUTPATIENT)
Dept: OUTPATIENT SERVICES | Facility: HOSPITAL | Age: 78
LOS: 1 days | End: 2018-12-07
Payer: MEDICARE

## 2018-12-07 DIAGNOSIS — R18.0 MALIGNANT ASCITES: ICD-10-CM

## 2018-12-07 DIAGNOSIS — Z98.890 OTHER SPECIFIED POSTPROCEDURAL STATES: Chronic | ICD-10-CM

## 2018-12-07 DIAGNOSIS — C78.6 SECONDARY MALIGNANT NEOPLASM OF RETROPERITONEUM AND PERITONEUM: ICD-10-CM

## 2018-12-07 DIAGNOSIS — Z90.710 ACQUIRED ABSENCE OF BOTH CERVIX AND UTERUS: Chronic | ICD-10-CM

## 2018-12-07 PROCEDURE — 49083 ABD PARACENTESIS W/IMAGING: CPT

## 2018-12-07 PROCEDURE — P9047: CPT

## 2018-12-07 RX ORDER — ALBUMIN HUMAN 25 %
50 VIAL (ML) INTRAVENOUS
Qty: 0 | Refills: 0 | Status: COMPLETED | OUTPATIENT
Start: 2018-12-07 | End: 2018-12-07

## 2018-12-07 RX ADMIN — Medication 100 MILLILITER(S): at 10:30

## 2018-12-07 RX ADMIN — Medication 100 MILLILITER(S): at 10:24

## 2018-12-10 PROCEDURE — 49083 ABD PARACENTESIS W/IMAGING: CPT

## 2018-12-13 NOTE — ED ADULT TRIAGE NOTE - WEIGHT METHOD
PT DAILY TREATMENT NOTE - [facility] 2-15    Patient Name: Indu Watson  Date:2018  : 1989  [x]  Patient  Verified  Payor: Marlen Landon / Plan: [employer] / Product Type: PPO /    In time 105 P Out time:200 P  Total Treatment Time (min): 55  Total Timed Codes (min): 45  Visit #: 4    Treatment Area: Right hip pain [M25.551]    SUBJECTIVE  Pain Level (0-10 scale): 0  Any medication changes, allergies to medications, adverse drug reactions, diagnosis change, or new procedure performed?: [x] No    [] Yes (see summary sheet for update)  Subjective functional status/changes:   [] No changes reported  Pt noting less pain in leg    OBJECTIVE  Palpation: TTP R sacral border   Modality rationale: decrease pain to improve the patients ability to perform ADLs   Type Additional Details   [] Estim: []Att   []Unatt    []TENS instruct                  []IFC  []Premod   []NMES                     []Other:  []w/US   []w/ice   []w/heat  Position:  Location:   []  Traction: [] Cervical       []Lumbar                       [] Prone          []Supine                       []Intermittent   []Continuous Lbs:  [] before manual  [] after manual  []w/heat   []  Ultrasound: []Continuous   [] Pulsed                       at: []1MHz   []3MHz Location:  W/cm2:   [] Paraffin         Location:   []w/heat   []  Ice  10 min   [x]  Heat  10'  []  Ice massage Position: supine  Location: low back    []  Laser  []  Other: Position:  Location:   []  Vasopneumatic Device Pressure:       [] lo [] med [] hi   Temperature:      [x] Skin assessment post-treatment:  [x]intact []redness- no adverse reaction    []redness - adverse reaction:     35 min Therapeutic Exercise:  [x] See flow sheet :    Rationale: increase ROM and increase strength to improve the patients ability to perform ADLs      10 min Manual Therapy: R long axis distraction in loose pack position (R). Prone CPA L1-L5 grade I-II.    Rationale: decrease pain to improve the patients ability to perform ADLs             With   [] TE   [] TA   [] neuro   [] other: Patient Education: [x] Review HEP    [] Progressed/Changed HEP based on:   [] positioning   [] body mechanics   [] transfers   [] heat/ice application         Other Objective/Functional Measures: nt     Pain Level (0-10 scale) post treatment: 0    ASSESSMENT/Changes in Function:     Patient will continue to benefit from skilled PT services to modify and progress therapeutic interventions, address functional mobility deficits, address strength deficits, analyze and address soft tissue restrictions, analyze and cue movement patterns, analyze and modify body mechanics/ergonomics and assess and modify postural abnormalities to attain remaining goals. Progress towards goals / Updated goals:  Short Term Goals: To be accomplished in 5 treatments:  -Independent in HEP as evidenced on ability to perform at least 5 exercises from HEP using proper form without verbal cuing.   -Pain less than or equal to 6/10 at worst to allow patient to perform ADL's with greater ease  -Demostrate proper posture in order to decrease lumbar pain  -Pt will report compliance with icing 1-2x/day in order to decrease inflammation  -Pt will report radicular symptoms centralized to knee      Long Term Goals: To be accomplished in 10 treatments:  -AROM lumbar extension pain free to allow pt to stand for prolonged periods of time  -Pt will report no pain with sleeping   -Pt will be able to mop without pain to allow her to perform work duties  -Eliminate radicular symptoms (R) LE    PLAN  []  Upgrade activities as tolerated     [x]  Continue plan of care  []  Update interventions per flow sheet       []  Discharge due to:_  []  Other:_      Abdiel Jimenez, PT 12/13/2018  109 PM stated

## 2018-12-19 ENCOUNTER — OUTPATIENT (OUTPATIENT)
Dept: OUTPATIENT SERVICES | Facility: HOSPITAL | Age: 78
LOS: 1 days | End: 2018-12-19
Payer: MEDICARE

## 2018-12-19 DIAGNOSIS — Z90.710 ACQUIRED ABSENCE OF BOTH CERVIX AND UTERUS: Chronic | ICD-10-CM

## 2018-12-19 DIAGNOSIS — Z98.890 OTHER SPECIFIED POSTPROCEDURAL STATES: Chronic | ICD-10-CM

## 2018-12-19 DIAGNOSIS — R18.8 OTHER ASCITES: ICD-10-CM

## 2018-12-19 PROCEDURE — 49083 ABD PARACENTESIS W/IMAGING: CPT

## 2018-12-19 PROCEDURE — P9047: CPT

## 2018-12-19 RX ORDER — ALBUMIN HUMAN 25 %
50 VIAL (ML) INTRAVENOUS
Qty: 0 | Refills: 0 | Status: COMPLETED | OUTPATIENT
Start: 2018-12-19 | End: 2018-12-19

## 2018-12-19 RX ADMIN — Medication 50 MILLILITER(S): at 11:00

## 2018-12-19 RX ADMIN — Medication 50 MILLILITER(S): at 09:58

## 2018-12-31 ENCOUNTER — OUTPATIENT (OUTPATIENT)
Dept: OUTPATIENT SERVICES | Facility: HOSPITAL | Age: 78
LOS: 1 days | End: 2018-12-31
Payer: MEDICARE

## 2018-12-31 DIAGNOSIS — Z90.710 ACQUIRED ABSENCE OF BOTH CERVIX AND UTERUS: Chronic | ICD-10-CM

## 2018-12-31 DIAGNOSIS — Z98.890 OTHER SPECIFIED POSTPROCEDURAL STATES: Chronic | ICD-10-CM

## 2018-12-31 DIAGNOSIS — R18.8 OTHER ASCITES: ICD-10-CM

## 2018-12-31 LAB
APTT BLD: 31.7 SEC — SIGNIFICANT CHANGE UP (ref 28.5–37)
HCT VFR BLD CALC: 26.6 % — LOW (ref 34.5–45)
HGB BLD-MCNC: 8 G/DL — LOW (ref 11.5–15.5)
INR BLD: 1.09 RATIO — SIGNIFICANT CHANGE UP (ref 0.88–1.16)
MCHC RBC-ENTMCNC: 26.1 PG — LOW (ref 27–34)
MCHC RBC-ENTMCNC: 30.1 GM/DL — LOW (ref 32–36)
MCV RBC AUTO: 86.6 FL — SIGNIFICANT CHANGE UP (ref 80–100)
NRBC # BLD: 0 /100 WBCS — SIGNIFICANT CHANGE UP (ref 0–0)
PLATELET # BLD AUTO: 368 K/UL — SIGNIFICANT CHANGE UP (ref 150–400)
PROTHROM AB SERPL-ACNC: 12.5 SEC — SIGNIFICANT CHANGE UP (ref 10–12.9)
RBC # BLD: 3.07 M/UL — LOW (ref 3.8–5.2)
RBC # FLD: 21.8 % — HIGH (ref 10.3–14.5)
WBC # BLD: 6.06 K/UL — SIGNIFICANT CHANGE UP (ref 3.8–10.5)
WBC # FLD AUTO: 6.06 K/UL — SIGNIFICANT CHANGE UP (ref 3.8–10.5)

## 2018-12-31 PROCEDURE — 85027 COMPLETE CBC AUTOMATED: CPT

## 2018-12-31 PROCEDURE — 85730 THROMBOPLASTIN TIME PARTIAL: CPT

## 2018-12-31 PROCEDURE — 49083 ABD PARACENTESIS W/IMAGING: CPT

## 2018-12-31 PROCEDURE — 85610 PROTHROMBIN TIME: CPT

## 2018-12-31 PROCEDURE — P9047: CPT

## 2018-12-31 RX ORDER — ALBUMIN HUMAN 25 %
50 VIAL (ML) INTRAVENOUS
Qty: 0 | Refills: 0 | Status: COMPLETED | OUTPATIENT
Start: 2018-12-31 | End: 2018-12-31

## 2018-12-31 RX ADMIN — Medication 100 MILLILITER(S): at 09:23

## 2018-12-31 RX ADMIN — Medication 100 MILLILITER(S): at 10:06

## 2019-01-14 ENCOUNTER — OUTPATIENT (OUTPATIENT)
Dept: OUTPATIENT SERVICES | Facility: HOSPITAL | Age: 79
LOS: 1 days | End: 2019-01-14
Payer: MEDICARE

## 2019-01-14 DIAGNOSIS — Z98.890 OTHER SPECIFIED POSTPROCEDURAL STATES: Chronic | ICD-10-CM

## 2019-01-14 DIAGNOSIS — C78.6 SECONDARY MALIGNANT NEOPLASM OF RETROPERITONEUM AND PERITONEUM: ICD-10-CM

## 2019-01-14 DIAGNOSIS — Z90.710 ACQUIRED ABSENCE OF BOTH CERVIX AND UTERUS: Chronic | ICD-10-CM

## 2019-01-14 PROCEDURE — 76942 ECHO GUIDE FOR BIOPSY: CPT | Mod: 26

## 2019-01-14 PROCEDURE — C1769: CPT

## 2019-01-14 PROCEDURE — C1729: CPT

## 2019-01-14 PROCEDURE — 49418 INSERT TUN IP CATH PERC: CPT

## 2019-01-14 PROCEDURE — 76942 ECHO GUIDE FOR BIOPSY: CPT

## 2019-01-14 PROCEDURE — 49406 IMAGE CATH FLUID PERI/RETRO: CPT

## 2019-01-14 PROCEDURE — C1894: CPT

## 2019-01-14 PROCEDURE — P9047: CPT

## 2019-01-14 PROCEDURE — 76000 FLUOROSCOPY <1 HR PHYS/QHP: CPT

## 2019-01-14 RX ORDER — ALBUMIN HUMAN 25 %
50 VIAL (ML) INTRAVENOUS
Qty: 0 | Refills: 0 | Status: COMPLETED | OUTPATIENT
Start: 2019-01-14 | End: 2019-01-14

## 2019-01-14 RX ADMIN — Medication 100 MILLILITER(S): at 09:36

## 2019-01-14 RX ADMIN — Medication 100 MILLILITER(S): at 10:12

## 2021-04-09 NOTE — ED ADULT TRIAGE NOTE - TEMPERATURE IN FAHRENHEIT (DEGREES F)
Problem: Adult Inpatient Plan of Care  Goal: Plan of Care Review  Flowsheets  Taken 4/9/2021 [id number]  Progress: no change  Plan of Care Reviewed With: patient  Taken 4/9/2021 1405  Progress: no change  Plan of Care Reviewed With: (caregiver left room during portion of OT tx) patient  Outcome Summary: Pt performed 2 sets x20 reps B UE exercises while sitting in recliner. Pt educated on home safety/fall prevention. Continue OT POC   Goal Outcome Evaluation:  Plan of Care Reviewed With: patient  Progress: no change  Outcome Summary: Pt performed 2 sets x20 reps B UE exercises while sitting in recliner. Pt educated on home safety/fall prevention. Continue OT POC   97.8

## 2022-08-17 NOTE — ED PROVIDER NOTE - DURATION
Caller: Jarek Alina     [facility]:[phone]    What is your medical concern? PATIENT STATED THAT THE oxybutynin XL (DITROPAN-XL) 10 MG 24 hr tablet THAT SHE TAKES ONCE A DAY IS NOT HELPING HER OVERACTIVE BLADDER. PATIENT WANTS TO KNOW IF SHE CAN INCREASE THIS DOSAGE OR IF THERE IS AN ALTERNATIVE FOR THIS THAT MAY WORK BETTER.    How long has this issue been going on? 4 MONTHS    Is your provider already aware of this issue? YES    Have you been treated for this issue? NO    
yesterday

## 2023-09-07 NOTE — ED ADULT NURSE NOTE - ASSIGNED BED LOCATION
Time-based billing (NON-critical care)
1E Room 100

## 2023-11-20 NOTE — ED PROVIDER NOTE - CPE EDP ENMT NORM
How Severe Is It?: moderate Is This A New Presentation, Or A Follow-Up?: Follow Up Vitiligo normal...
